# Patient Record
Sex: FEMALE | Race: WHITE | NOT HISPANIC OR LATINO | Employment: OTHER | URBAN - METROPOLITAN AREA
[De-identification: names, ages, dates, MRNs, and addresses within clinical notes are randomized per-mention and may not be internally consistent; named-entity substitution may affect disease eponyms.]

---

## 2023-02-09 PROBLEM — N39.0 RECURRENT UTI: Status: ACTIVE | Noted: 2023-02-09

## 2023-02-09 PROBLEM — E03.9 HYPOTHYROIDISM: Status: ACTIVE | Noted: 2023-02-09

## 2023-02-09 PROBLEM — E55.9 VITAMIN D DEFICIENCY: Status: ACTIVE | Noted: 2023-02-09

## 2023-02-09 PROBLEM — N64.4 BREAST PAIN, LEFT: Status: ACTIVE | Noted: 2023-02-09

## 2023-02-09 PROBLEM — I10 HYPERTENSION, ESSENTIAL, BENIGN: Status: ACTIVE | Noted: 2023-02-09

## 2023-02-09 PROBLEM — R14.0 BLOATING: Status: ACTIVE | Noted: 2023-02-09

## 2023-02-09 PROBLEM — R39.89 URINE TROUBLES: Status: ACTIVE | Noted: 2023-02-09

## 2023-02-09 PROBLEM — J06.9 URI (UPPER RESPIRATORY INFECTION): Status: ACTIVE | Noted: 2023-02-09

## 2023-02-09 PROBLEM — J01.90 ACUTE SINUSITIS, UNSPECIFIED: Status: ACTIVE | Noted: 2023-02-09

## 2023-02-09 PROBLEM — S82.899A ANKLE FRACTURE: Status: ACTIVE | Noted: 2023-02-09

## 2023-02-09 PROBLEM — M06.9 RHEUMATOID ARTHRITIS (MULTI): Status: ACTIVE | Noted: 2023-02-09

## 2023-02-09 PROBLEM — R30.0 DYSURIA: Status: ACTIVE | Noted: 2023-02-09

## 2023-02-09 PROBLEM — Z93.3 COLOSTOMY IN PLACE (MULTI): Status: ACTIVE | Noted: 2023-02-09

## 2023-02-09 PROBLEM — R82.90 FOUL SMELLING URINE: Status: ACTIVE | Noted: 2023-02-09

## 2023-02-09 PROBLEM — R94.31 ECG ABNORMAL: Status: ACTIVE | Noted: 2023-02-09

## 2023-02-09 PROBLEM — K57.32 DIVERTICULITIS, COLON: Status: ACTIVE | Noted: 2023-02-09

## 2023-02-09 PROBLEM — R19.7 DIARRHEA: Status: ACTIVE | Noted: 2023-02-09

## 2023-02-09 PROBLEM — E78.5 HYPERLIPIDEMIA: Status: ACTIVE | Noted: 2023-02-09

## 2023-02-09 PROBLEM — K21.9 CHRONIC GERD: Status: ACTIVE | Noted: 2023-02-09

## 2023-02-09 RX ORDER — METHOTREXATE 2.5 MG/1
6 TABLET ORAL
COMMUNITY

## 2023-02-09 RX ORDER — CHOLECALCIFEROL (VITAMIN D3) 125 MCG
CAPSULE ORAL
COMMUNITY
End: 2024-04-26 | Stop reason: WASHOUT

## 2023-02-09 RX ORDER — DILTIAZEM HYDROCHLORIDE 30 MG/1
1 TABLET, FILM COATED ORAL 2 TIMES DAILY
COMMUNITY
Start: 2018-08-22 | End: 2023-03-22 | Stop reason: SDUPTHER

## 2023-02-09 RX ORDER — LEVOTHYROXINE SODIUM 75 UG/1
1 TABLET ORAL DAILY
COMMUNITY
Start: 2018-08-15 | End: 2023-03-22 | Stop reason: SDUPTHER

## 2023-02-09 RX ORDER — ENALAPRIL MALEATE 5 MG/1
1 TABLET ORAL 2 TIMES DAILY
COMMUNITY
Start: 2018-08-15 | End: 2023-03-22 | Stop reason: SDUPTHER

## 2023-02-09 RX ORDER — FOLIC ACID 1 MG/1
1 TABLET ORAL DAILY
COMMUNITY
Start: 2018-08-22 | End: 2023-03-22 | Stop reason: SDUPTHER

## 2023-02-09 RX ORDER — LANSOPRAZOLE 30 MG/1
30 CAPSULE, DELAYED RELEASE ORAL DAILY
COMMUNITY
Start: 2018-08-15 | End: 2023-03-22 | Stop reason: SDUPTHER

## 2023-02-09 RX ORDER — MULTIVIT-MIN/IRON/FOLIC ACID/K 18-600-40
CAPSULE ORAL
COMMUNITY
End: 2024-04-26 | Stop reason: WASHOUT

## 2023-02-09 RX ORDER — ATORVASTATIN CALCIUM 80 MG/1
1 TABLET, FILM COATED ORAL NIGHTLY
COMMUNITY
Start: 2018-08-15 | End: 2023-03-22 | Stop reason: SDUPTHER

## 2023-03-22 ENCOUNTER — OFFICE VISIT (OUTPATIENT)
Dept: PRIMARY CARE | Facility: CLINIC | Age: 83
End: 2023-03-22
Payer: MEDICARE

## 2023-03-22 VITALS
OXYGEN SATURATION: 98 % | BODY MASS INDEX: 23.34 KG/M2 | DIASTOLIC BLOOD PRESSURE: 78 MMHG | HEART RATE: 103 BPM | SYSTOLIC BLOOD PRESSURE: 136 MMHG | RESPIRATION RATE: 16 BRPM | WEIGHT: 136 LBS

## 2023-03-22 DIAGNOSIS — K21.9 CHRONIC GERD: ICD-10-CM

## 2023-03-22 DIAGNOSIS — Z13.31 DEPRESSION SCREENING: ICD-10-CM

## 2023-03-22 DIAGNOSIS — E03.9 HYPOTHYROIDISM, UNSPECIFIED TYPE: ICD-10-CM

## 2023-03-22 DIAGNOSIS — Z00.00 MEDICARE ANNUAL WELLNESS VISIT, SUBSEQUENT: Primary | ICD-10-CM

## 2023-03-22 DIAGNOSIS — E53.8 FOLIC ACID DEFICIENCY: ICD-10-CM

## 2023-03-22 DIAGNOSIS — E78.5 HYPERLIPIDEMIA, UNSPECIFIED HYPERLIPIDEMIA TYPE: ICD-10-CM

## 2023-03-22 DIAGNOSIS — Z71.89 ADVANCE DIRECTIVE DISCUSSED WITH PATIENT: ICD-10-CM

## 2023-03-22 DIAGNOSIS — I10 HYPERTENSION, ESSENTIAL, BENIGN: ICD-10-CM

## 2023-03-22 PROCEDURE — 1159F MED LIST DOCD IN RCRD: CPT | Performed by: NURSE PRACTITIONER

## 2023-03-22 PROCEDURE — 3075F SYST BP GE 130 - 139MM HG: CPT | Performed by: NURSE PRACTITIONER

## 2023-03-22 PROCEDURE — 99214 OFFICE O/P EST MOD 30 MIN: CPT | Performed by: NURSE PRACTITIONER

## 2023-03-22 PROCEDURE — 1170F FXNL STATUS ASSESSED: CPT | Performed by: NURSE PRACTITIONER

## 2023-03-22 PROCEDURE — 1160F RVW MEDS BY RX/DR IN RCRD: CPT | Performed by: NURSE PRACTITIONER

## 2023-03-22 PROCEDURE — 3078F DIAST BP <80 MM HG: CPT | Performed by: NURSE PRACTITIONER

## 2023-03-22 RX ORDER — FOLIC ACID 1 MG/1
1 TABLET ORAL DAILY
Qty: 90 TABLET | Refills: 2 | Status: SHIPPED | OUTPATIENT
Start: 2023-03-22 | End: 2024-03-26 | Stop reason: SDUPTHER

## 2023-03-22 RX ORDER — ATORVASTATIN CALCIUM 80 MG/1
80 TABLET, FILM COATED ORAL NIGHTLY
Qty: 90 TABLET | Refills: 3 | Status: SHIPPED | OUTPATIENT
Start: 2023-03-22 | End: 2023-09-22 | Stop reason: SDUPTHER

## 2023-03-22 RX ORDER — LANSOPRAZOLE 30 MG/1
30 CAPSULE, DELAYED RELEASE ORAL DAILY
Qty: 90 CAPSULE | Refills: 3 | Status: SHIPPED | OUTPATIENT
Start: 2023-03-22 | End: 2024-03-26 | Stop reason: SDUPTHER

## 2023-03-22 RX ORDER — LEVOTHYROXINE SODIUM 75 UG/1
75 TABLET ORAL DAILY
Qty: 90 TABLET | Refills: 3 | Status: SHIPPED | OUTPATIENT
Start: 2023-03-22 | End: 2024-03-26 | Stop reason: SDUPTHER

## 2023-03-22 RX ORDER — CHOLECALCIFEROL (VITAMIN D3) 125 MCG
125 CAPSULE ORAL DAILY
Qty: 90 CAPSULE | Refills: 3 | Status: CANCELLED | OUTPATIENT
Start: 2023-03-22

## 2023-03-22 RX ORDER — ENALAPRIL MALEATE 5 MG/1
5 TABLET ORAL 2 TIMES DAILY
Qty: 90 TABLET | Refills: 3 | Status: SHIPPED | OUTPATIENT
Start: 2023-03-22 | End: 2023-04-17

## 2023-03-22 RX ORDER — DILTIAZEM HYDROCHLORIDE 30 MG/1
30 TABLET, FILM COATED ORAL 2 TIMES DAILY
Qty: 90 TABLET | Refills: 3 | Status: SHIPPED | OUTPATIENT
Start: 2023-03-22 | End: 2023-09-22 | Stop reason: SDUPTHER

## 2023-03-22 RX ORDER — MULTIVIT-MIN/IRON/FOLIC ACID/K 18-600-40
CAPSULE ORAL
Qty: 60 CAPSULE | Status: CANCELLED | OUTPATIENT
Start: 2023-03-22

## 2023-03-22 ASSESSMENT — ACTIVITIES OF DAILY LIVING (ADL)
BATHING: INDEPENDENT
MANAGING_FINANCES: INDEPENDENT
DOING_HOUSEWORK: INDEPENDENT
GROCERY_SHOPPING: INDEPENDENT
TAKING_MEDICATION: INDEPENDENT
DRESSING: INDEPENDENT

## 2023-03-22 ASSESSMENT — PATIENT HEALTH QUESTIONNAIRE - PHQ9
2. FEELING DOWN, DEPRESSED OR HOPELESS: NOT AT ALL
SUM OF ALL RESPONSES TO PHQ9 QUESTIONS 1 AND 2: 0
1. LITTLE INTEREST OR PLEASURE IN DOING THINGS: NOT AT ALL

## 2023-03-22 ASSESSMENT — ENCOUNTER SYMPTOMS
LOSS OF SENSATION IN FEET: 0
OCCASIONAL FEELINGS OF UNSTEADINESS: 0
DEPRESSION: 0

## 2023-03-22 NOTE — PROGRESS NOTES
Subjective   Reason for Visit: Nina Dalal is an 82 y.o. female here for a Medicare Wellness visit.     Past Medical, Surgical, and Family History reviewed and updated in chart.    Reviewed all medications by prescribing practitioner or clinical pharmacist (such as prescriptions, OTCs, herbal therapies and supplements) and documented in the medical record.    Patient is also here for management of multiple chronic diseases.  Reports that she will be having a left knee surgery in May of this year at the [facility] to be completed by Dr. Yeboah.  Denies acute medical complaint.        Patient Care Team:  ALLI Lima-CNP as PCP - General (Family Medicine)  Figueroa Antoine MD as PCP - [facility] Attributed Provider     Review of Systems   All other systems reviewed and are negative.      Objective   Vitals:  /78 (BP Location: Left arm)   Pulse 103   Resp 16   Wt 61.7 kg (136 lb)   SpO2 98%   BMI 23.34 kg/m²       Physical Exam  Vitals and nursing note reviewed.   Constitutional:       Appearance: Normal appearance.   HENT:      Head: Normocephalic and atraumatic.      Right Ear: Tympanic membrane, ear canal and external ear normal.      Left Ear: Tympanic membrane, ear canal and external ear normal.      Nose: Nose normal.      Mouth/Throat:      Mouth: Mucous membranes are moist.   Eyes:      Extraocular Movements: Extraocular movements intact.      Conjunctiva/sclera: Conjunctivae normal.      Pupils: Pupils are equal, round, and reactive to light.   Cardiovascular:      Rate and Rhythm: Normal rate and regular rhythm.      Pulses: Normal pulses.      Heart sounds: Normal heart sounds.   Pulmonary:      Effort: Pulmonary effort is normal.      Breath sounds: Normal breath sounds.   Abdominal:      General: Abdomen is flat. Bowel sounds are normal.      Palpations: Abdomen is soft.   Musculoskeletal:         General: Normal range of motion.      Cervical back: Normal range of motion and  neck supple.   Skin:     General: Skin is warm and dry.      Capillary Refill: Capillary refill takes more than 3 seconds.   Neurological:      General: No focal deficit present.      Mental Status: She is alert and oriented to person, place, and time.   Psychiatric:         Mood and Affect: Mood normal.         Behavior: Behavior normal.         Thought Content: Thought content normal.         Judgment: Judgment normal.         Assessment/Plan   Problem List Items Addressed This Visit          Circulatory    Hypertension, essential, benign    Relevant Medications    dilTIAZem (Cardizem) 30 mg immediate release tablet    enalapril (Vasotec) 5 mg tablet       Digestive    Chronic GERD    Relevant Medications    lansoprazole (Prevacid) 30 mg DR capsule       Endocrine/Metabolic    Hypothyroidism - Primary    Relevant Medications    levothyroxine (Synthroid, Levoxyl) 75 mcg tablet       Other    Hyperlipidemia    Relevant Medications    atorvastatin (Lipitor) 80 mg tablet     Other Visit Diagnoses       Folic acid deficiency        Relevant Medications    folic acid (Folvite) 1 mg tablet

## 2023-04-17 ENCOUNTER — TELEPHONE (OUTPATIENT)
Dept: PRIMARY CARE | Facility: CLINIC | Age: 83
End: 2023-04-17
Payer: MEDICARE

## 2023-04-17 DIAGNOSIS — I10 HYPERTENSION, ESSENTIAL, BENIGN: ICD-10-CM

## 2023-04-17 RX ORDER — ENALAPRIL MALEATE 10 MG/1
10 TABLET ORAL 2 TIMES DAILY
Qty: 180 TABLET | Refills: 1 | Status: SHIPPED | OUTPATIENT
Start: 2023-04-17 | End: 2023-09-22 | Stop reason: SDUPTHER

## 2023-04-17 NOTE — TELEPHONE ENCOUNTER
Patient was at [facility] today and is scheduled to have a total knee replacement on May 1st but her blood pressure in the office today was 183/90 and 177/80. Anesthesiologist would like it under control before surgery. Please advise

## 2023-04-24 ENCOUNTER — TELEPHONE (OUTPATIENT)
Dept: PRIMARY CARE | Facility: CLINIC | Age: 83
End: 2023-04-24
Payer: MEDICARE

## 2023-04-24 NOTE — TELEPHONE ENCOUNTER
[facility] would like the surgery clearance for the patient to be faxed to [phone]

## 2023-04-28 ENCOUNTER — OFFICE VISIT (OUTPATIENT)
Dept: PRIMARY CARE | Facility: CLINIC | Age: 83
End: 2023-04-28
Payer: MEDICARE

## 2023-04-28 VITALS
SYSTOLIC BLOOD PRESSURE: 120 MMHG | HEIGHT: 64 IN | HEART RATE: 108 BPM | BODY MASS INDEX: 23.01 KG/M2 | OXYGEN SATURATION: 97 % | WEIGHT: 134.8 LBS | DIASTOLIC BLOOD PRESSURE: 74 MMHG | RESPIRATION RATE: 18 BRPM

## 2023-04-28 DIAGNOSIS — G89.29 CHRONIC PAIN OF LEFT KNEE: ICD-10-CM

## 2023-04-28 DIAGNOSIS — Z01.818 PREOPERATIVE CLEARANCE: Primary | ICD-10-CM

## 2023-04-28 DIAGNOSIS — M25.562 CHRONIC PAIN OF LEFT KNEE: ICD-10-CM

## 2023-04-28 PROCEDURE — 1159F MED LIST DOCD IN RCRD: CPT

## 2023-04-28 PROCEDURE — 1160F RVW MEDS BY RX/DR IN RCRD: CPT

## 2023-04-28 PROCEDURE — 1036F TOBACCO NON-USER: CPT

## 2023-04-28 PROCEDURE — 99213 OFFICE O/P EST LOW 20 MIN: CPT

## 2023-04-28 PROCEDURE — 3074F SYST BP LT 130 MM HG: CPT

## 2023-04-28 PROCEDURE — 3078F DIAST BP <80 MM HG: CPT

## 2023-04-28 ASSESSMENT — ENCOUNTER SYMPTOMS
CONSTITUTIONAL NEGATIVE: 1
ENDOCRINE NEGATIVE: 1
PSYCHIATRIC NEGATIVE: 1
EYES NEGATIVE: 1
GASTROINTESTINAL NEGATIVE: 1
CARDIOVASCULAR NEGATIVE: 1
NEUROLOGICAL NEGATIVE: 1
MUSCULOSKELETAL NEGATIVE: 1
HEMATOLOGIC/LYMPHATIC NEGATIVE: 1
RESPIRATORY NEGATIVE: 1
ALLERGIC/IMMUNOLOGIC NEGATIVE: 1

## 2023-04-28 ASSESSMENT — PATIENT HEALTH QUESTIONNAIRE - PHQ9
SUM OF ALL RESPONSES TO PHQ9 QUESTIONS 1 AND 2: 0
2. FEELING DOWN, DEPRESSED OR HOPELESS: NOT AT ALL
1. LITTLE INTEREST OR PLEASURE IN DOING THINGS: NOT AT ALL

## 2023-04-28 NOTE — PROGRESS NOTES
Surgical clearance- left total knee replacemenmt 5/1/23    Paperwork must be in to [facility] today

## 2023-04-28 NOTE — PROGRESS NOTES
Referring Surgeon: Dr. Lyndon Yeboah  Date of Surgery: 5/1/2023  Planned Surgery/Procedure: Left Jama assisted Total knee Arthroplasty  Incidation for above: Chronic Left knee pain    Review of Systems   Constitutional: Negative.    HENT: Negative.     Eyes: Negative.    Respiratory: Negative.     Cardiovascular: Negative.    Gastrointestinal: Negative.    Endocrine: Negative.    Genitourinary: Negative.    Musculoskeletal: Negative.    Skin: Negative.    Allergic/Immunologic: Negative.    Neurological: Negative.    Hematological: Negative.    Psychiatric/Behavioral: Negative.     All other systems reviewed and are negative.      Past Medical History:   Diagnosis Date    Nontoxic multinodular goiter     Multinodular goiter    Personal history of other diseases of the digestive system     History of gastroesophageal reflux (GERD)    Personal history of other diseases of the digestive system 08/14/2020    History of diverticulitis    Personal history of other specified conditions     History of fibrocystic disease of breast     Past Surgical History:   Procedure Laterality Date    OTHER SURGICAL HISTORY  08/14/2020    Sigmoidectomy    OTHER SURGICAL HISTORY  11/26/2019    Hysterectomy total abdominal with removal of both ovaries    OTHER SURGICAL HISTORY  11/26/2019    Cataract surgery    OTHER SURGICAL HISTORY  10/26/2020    Resection     Family History   Problem Relation Name Age of Onset    Diabetes Mother      Hypertension Mother      Lung cancer Father       Social History     Socioeconomic History    Marital status: Single     Spouse name: None    Number of children: None    Years of education: None    Highest education level: None   Occupational History    None   Tobacco Use    Smoking status: Never    Smokeless tobacco: Never   Vaping Use    Vaping status: None   Substance and Sexual Activity    Alcohol use: None    Drug use: Never    Sexual activity: None   Other Topics Concern    None   Social History  Narrative    None     Social Determinants of Health     Financial Resource Strain: Not on file   Food Insecurity: Not on file   Transportation Needs: Not on file   Physical Activity: Not on file   Stress: Not on file   Social Connections: Not on file   Intimate Partner Violence: Not on file   Housing Stability: Not on file       Current Outpatient Medications:     ascorbic acid, vitamin C, 500 mg capsule, Take by mouth., Disp: , Rfl:     atorvastatin (Lipitor) 80 mg tablet, Take 1 tablet (80 mg) by mouth once daily at bedtime., Disp: 90 tablet, Rfl: 3    cholecalciferol (Vitamin D-3) 125 MCG (5000 UT) capsule, Take by mouth., Disp: , Rfl:     dilTIAZem (Cardizem) 30 mg immediate release tablet, Take 1 tablet (30 mg) by mouth in the morning and 1 tablet (30 mg) before bedtime., Disp: 90 tablet, Rfl: 3    enalapril (Vasotec) 10 mg tablet, Take 1 tablet (10 mg) by mouth in the morning and 1 tablet (10 mg) before bedtime., Disp: 180 tablet, Rfl: 1    folic acid (Folvite) 1 mg tablet, Take 1 tablet (1 mg) by mouth once daily., Disp: 90 tablet, Rfl: 2    lansoprazole (Prevacid) 30 mg DR capsule, Take 1 capsule (30 mg) by mouth once daily., Disp: 90 capsule, Rfl: 3    levothyroxine (Synthroid, Levoxyl) 75 mcg tablet, Take 1 tablet (75 mcg) by mouth once daily., Disp: 90 tablet, Rfl: 3    methotrexate (Trexall) 2.5 mg tablet, Take 6 tablets (15 mg total) by mouth 1 (one) time per week., Disp: , Rfl:     Physical Exam  Vitals and nursing note reviewed.   Constitutional:       Appearance: Normal appearance.   HENT:      Head: Normocephalic and atraumatic.      Right Ear: Tympanic membrane normal.      Left Ear: Tympanic membrane normal.      Nose: Nose normal.      Mouth/Throat:      Mouth: Mucous membranes are moist.      Pharynx: Oropharynx is clear.   Eyes:      Extraocular Movements: Extraocular movements intact.      Conjunctiva/sclera: Conjunctivae normal.      Pupils: Pupils are equal, round, and reactive to light.    Cardiovascular:      Rate and Rhythm: Normal rate and regular rhythm.   Pulmonary:      Effort: Pulmonary effort is normal.      Breath sounds: Normal breath sounds.   Abdominal:      General: Bowel sounds are normal.      Palpations: Abdomen is soft.   Musculoskeletal:         General: Normal range of motion.      Cervical back: Normal range of motion and neck supple.   Skin:     General: Skin is warm.      Capillary Refill: Capillary refill takes less than 2 seconds.   Neurological:      General: No focal deficit present.      Mental Status: She is alert and oriented to person, place, and time. Mental status is at baseline.   Psychiatric:         Mood and Affect: Mood normal.         Behavior: Behavior normal.         Thought Content: Thought content normal.         Judgment: Judgment normal.         Assesment/Plan:     Diagnostic tests reviewed or ordered:   Labs: N/A  Imaging: N/A  EKG: N/A    Medications to Adjust:   Antihypertensives--> Enalapril 10mg BID  Anticoagulation/Antiplatelet--> N/A  Antihyperglycemics--> N/A  NSAIDS--> N/A  Others--> N/A    I have discussed the above recommendations with the patient in detail, in yoseph and lay terms, and provded a verbal summery of recommendations. We have discussed that no surgery is without risk, but that the goal of the preoperative assesemenet is to optimize risk, and that was clearly understood by the patient. I have given ample oppoptunity for the patient to ask questions, and answered all questons to their stated satisfaction.    Prepared for surgery--> YES    No follow-ups on file. Follow up with orthopedics and PCP at scheduled appointments.     Discussed the above with the patient using shared decision making. the patient is in agreement with the iagnostic and treatment plan.      This document was generated using the assistance of voice recognition software. If there are any errors of spelling, grammar, syntax, or meaning; please feel free to contact me  directly for clarification.

## 2023-07-18 ENCOUNTER — PATIENT OUTREACH (OUTPATIENT)
Dept: PRIMARY CARE | Facility: CLINIC | Age: 83
End: 2023-07-18
Payer: MEDICARE

## 2023-07-18 NOTE — PROGRESS NOTES
Discharge Facility: [facility]  Discharge Diagnosis: Hematoma   Admission Date: 7-16-23  Discharge Date: 7-17-23     TCM complete.  Discharge date 7-17-23   2 attempts were made to reach patient to assess needs.   No return call as of this note.   If patient schedules follow up within 14 days of discharge, visit is TCM billable.  Message sent to practice clinical pool to reach out to patient and schedule an appointment within 7-13 days from discharge date.    If patient meets criteria for moderately complex & has follow-up within 14 days-can bill [id number].   If patient meets criteria for highly complex & has follow-up visit within 7 days-can bill [id number].    *virtual follow up needs modifier added (95 or GT)   *AWV AND TCM CAN BE BILLED TOGETHER WITH 25 MODIFIER    Alicia Aceves LPN

## 2023-07-21 ENCOUNTER — TELEPHONE (OUTPATIENT)
Dept: PRIMARY CARE | Facility: CLINIC | Age: 83
End: 2023-07-21
Payer: MEDICARE

## 2023-07-21 NOTE — TELEPHONE ENCOUNTER
----- Message from Jenn Manning MA sent at 7/19/2023 11:28 AM EDT -----  Regarding: FW: Hospital follow up    ----- Message -----  From: Alicia Aceves LPN  Sent: 7/19/2023  11:17 AM EDT  To:  [id number]  Subject: Hospital follow up                               Discharge Facility: [facility]  Discharge Diagnosis: Hematoma   Admission Date: 7-16-23  Discharge Date: 7-17-23     TCM complete.  Discharge date 7-17-23   2 attempts were made to reach patient to assess needs.   No return call as of this note.   If patient schedules follow up within 14 days of discharge, visit is TCM billable.  Message sent to practice clinical pool to reach out to patient and schedule an appointment within 7-13 days from discharge date.    If patient meets criteria for moderately complex & has follow-up within 14 days-can bill [id number].   If patient meets criteria for highly complex & has follow-up visit within 7 days-can bill [id number].    #virtual follow up needs modifier added (95 or GT)   #AWV AND TCM CAN BE BILLED TOGETHER WITH 25 MODIFIER    Alicia Aceves LPN

## 2023-07-21 NOTE — TELEPHONE ENCOUNTER
----- Message from Jenn Manning MA sent at 7/19/2023 11:28 AM EDT -----  Regarding: FW: Hospital follow up    ----- Message -----  From: Alicia Aceves LPN  Sent: 7/19/2023  11:17 AM EDT  To:  [id number]  Subject: Hospital follow up                               Discharge Facility: [facility]  Discharge Diagnosis: Hematoma   Admission Date: 7-16-23  Discharge Date: 7-17-23     TCM complete.  Discharge date 7-17-23   2 attempts were made to reach patient to assess needs.   No return call as of this note.   If patient schedules follow up within 14 days of discharge, visit is TCM billable.  Message sent to practice clinical pool to reach out to patient and schedule an appointment within 7-13 days from discharge date.    If patient meets criteria for moderately complex & has follow-up within 14 days-can bill [id number].   If patient meets criteria for highly complex & has follow-up visit within 7 days-can bill [id number].    #virtual follow up needs modifier added (95 or GT)   #AWV AND TCM CAN BE BILLED TOGETHER WITH 25 MODIFIER    Alicia Aceves LPN

## 2023-08-02 ENCOUNTER — PATIENT OUTREACH (OUTPATIENT)
Dept: PRIMARY CARE | Facility: CLINIC | Age: 83
End: 2023-08-02
Payer: MEDICARE

## 2023-08-02 NOTE — PROGRESS NOTES
Unable to reach patient for call back after patient's follow up appointment with PCP.   LVM with call back number for patient to call if needed   If no voicemail available call attempts x 2 were made to contact the patient to assist with any questions or concerns patient may have.    Alicia Aceves LPN

## 2023-08-11 ENCOUNTER — APPOINTMENT (OUTPATIENT)
Dept: PRIMARY CARE | Facility: CLINIC | Age: 83
End: 2023-08-11
Payer: MEDICARE

## 2023-09-22 ENCOUNTER — OFFICE VISIT (OUTPATIENT)
Dept: PRIMARY CARE | Facility: CLINIC | Age: 83
End: 2023-09-22
Payer: MEDICARE

## 2023-09-22 VITALS
SYSTOLIC BLOOD PRESSURE: 122 MMHG | HEIGHT: 64 IN | BODY MASS INDEX: 23.42 KG/M2 | HEART RATE: 101 BPM | DIASTOLIC BLOOD PRESSURE: 82 MMHG | RESPIRATION RATE: 16 BRPM | OXYGEN SATURATION: 98 % | WEIGHT: 137.2 LBS

## 2023-09-22 DIAGNOSIS — E03.9 HYPOTHYROIDISM, UNSPECIFIED TYPE: ICD-10-CM

## 2023-09-22 DIAGNOSIS — Z23 NEED FOR INFLUENZA VACCINATION: ICD-10-CM

## 2023-09-22 DIAGNOSIS — Z00.00 MEDICARE ANNUAL WELLNESS VISIT, SUBSEQUENT: ICD-10-CM

## 2023-09-22 DIAGNOSIS — D64.9 ANEMIA, UNSPECIFIED TYPE: ICD-10-CM

## 2023-09-22 DIAGNOSIS — I10 HYPERTENSION, ESSENTIAL, BENIGN: Primary | ICD-10-CM

## 2023-09-22 DIAGNOSIS — M06.9 RHEUMATOID ARTHRITIS INVOLVING MULTIPLE SITES, UNSPECIFIED WHETHER RHEUMATOID FACTOR PRESENT (MULTI): ICD-10-CM

## 2023-09-22 DIAGNOSIS — K21.9 CHRONIC GERD: ICD-10-CM

## 2023-09-22 DIAGNOSIS — E78.5 HYPERLIPIDEMIA, UNSPECIFIED HYPERLIPIDEMIA TYPE: ICD-10-CM

## 2023-09-22 DIAGNOSIS — E55.9 VITAMIN D DEFICIENCY: ICD-10-CM

## 2023-09-22 PROBLEM — T14.8XXA HEMATOMA: Status: ACTIVE | Noted: 2023-07-16

## 2023-09-22 PROBLEM — M48.061 SPINAL STENOSIS OF LUMBAR REGION: Status: ACTIVE | Noted: 2023-09-22

## 2023-09-22 PROBLEM — M17.9 OSTEOARTHRITIS OF KNEE: Status: ACTIVE | Noted: 2023-09-22

## 2023-09-22 PROBLEM — T14.90XA TRAUMA: Status: ACTIVE | Noted: 2023-07-17
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
## 2023-09-22 PROCEDURE — 3074F SYST BP LT 130 MM HG: CPT | Performed by: NURSE PRACTITIONER

## 2023-09-22 PROCEDURE — 3079F DIAST BP 80-89 MM HG: CPT | Performed by: NURSE PRACTITIONER

## 2023-09-22 PROCEDURE — 1160F RVW MEDS BY RX/DR IN RCRD: CPT | Performed by: NURSE PRACTITIONER

## 2023-09-22 PROCEDURE — G0008 ADMIN INFLUENZA VIRUS VAC: HCPCS | Performed by: NURSE PRACTITIONER

## 2023-09-22 PROCEDURE — 1159F MED LIST DOCD IN RCRD: CPT | Performed by: NURSE PRACTITIONER

## 2023-09-22 PROCEDURE — 1036F TOBACCO NON-USER: CPT | Performed by: NURSE PRACTITIONER

## 2023-09-22 PROCEDURE — 90662 IIV NO PRSV INCREASED AG IM: CPT | Performed by: NURSE PRACTITIONER

## 2023-09-22 PROCEDURE — 99214 OFFICE O/P EST MOD 30 MIN: CPT | Performed by: NURSE PRACTITIONER

## 2023-09-22 RX ORDER — BACITRACIN 500 [USP'U]/G
OINTMENT OPHTHALMIC
COMMUNITY
End: 2024-04-26 | Stop reason: WASHOUT

## 2023-09-22 RX ORDER — ATORVASTATIN CALCIUM 80 MG/1
80 TABLET, FILM COATED ORAL NIGHTLY
Qty: 90 TABLET | Refills: 3 | Status: SHIPPED | OUTPATIENT
Start: 2023-09-22 | End: 2024-03-26 | Stop reason: SDUPTHER

## 2023-09-22 RX ORDER — ACETAMINOPHEN 500 MG
1000 TABLET ORAL EVERY 8 HOURS PRN
COMMUNITY
Start: 2023-07-17

## 2023-09-22 RX ORDER — ENALAPRIL MALEATE 10 MG/1
10 TABLET ORAL 2 TIMES DAILY
Qty: 180 TABLET | Refills: 1 | Status: SHIPPED | OUTPATIENT
Start: 2023-09-22 | End: 2024-03-26 | Stop reason: SDUPTHER

## 2023-09-22 RX ORDER — DILTIAZEM HYDROCHLORIDE 30 MG/1
30 TABLET, FILM COATED ORAL 2 TIMES DAILY
Qty: 90 TABLET | Refills: 3 | Status: SHIPPED | OUTPATIENT
Start: 2023-09-22 | End: 2024-03-26 | Stop reason: SDUPTHER

## 2023-09-22 RX ORDER — CYCLOSPORINE 0.5 MG/ML
EMULSION OPHTHALMIC
COMMUNITY
End: 2024-04-26 | Stop reason: WASHOUT

## 2023-09-22 ASSESSMENT — ANXIETY QUESTIONNAIRES
2. NOT BEING ABLE TO STOP OR CONTROL WORRYING: NOT AT ALL
5. BEING SO RESTLESS THAT IT IS HARD TO SIT STILL: NOT AT ALL
3. WORRYING TOO MUCH ABOUT DIFFERENT THINGS: NOT AT ALL
4. TROUBLE RELAXING: NOT AT ALL
7. FEELING AFRAID AS IF SOMETHING AWFUL MIGHT HAPPEN: NOT AT ALL
6. BECOMING EASILY ANNOYED OR IRRITABLE: NOT AT ALL
1. FEELING NERVOUS, ANXIOUS, OR ON EDGE: NOT AT ALL
IF YOU CHECKED OFF ANY PROBLEMS ON THIS QUESTIONNAIRE, HOW DIFFICULT HAVE THESE PROBLEMS MADE IT FOR YOU TO DO YOUR WORK, TAKE CARE OF THINGS AT HOME, OR GET ALONG WITH OTHER PEOPLE: NOT DIFFICULT AT ALL
GAD7 TOTAL SCORE: 0

## 2023-09-22 ASSESSMENT — ENCOUNTER SYMPTOMS
DEPRESSION: 0
OCCASIONAL FEELINGS OF UNSTEADINESS: 0
LOSS OF SENSATION IN FEET: 0

## 2023-09-22 NOTE — PROGRESS NOTES
"Subjective   Patient ID: Nina Dalal is a 83 y.o. female who presents for Follow-up.    Patient is following up for management of multiple chronic diseases.  Advises that she takes all medication as prescribed and her symptoms are well controlled by current medication regimen with no side effect noted.  Advises that she had a knee replacement surgery followed by a fall a few weeks later resulting in a large hematoma on the right thigh area.  Advises that the hematoma is gradually healing and she is feeling much better.  Advises that she has no acute medical complaint.  Patient is due for seasonal influenza vaccine and it will be administered today during this office visit.         Review of Systems   All other systems reviewed and are negative.      Objective   /82   Pulse 101   Resp 16   Ht 1.626 m (5' 4\")   Wt 62.2 kg (137 lb 3.2 oz)   SpO2 98%   BMI 23.55 kg/m²     Physical Exam  Constitutional:       Appearance: Normal appearance.   HENT:      Head: Normocephalic and atraumatic.      Right Ear: External ear normal.      Left Ear: External ear normal.      Nose: Nose normal.      Mouth/Throat:      Mouth: Mucous membranes are moist.   Cardiovascular:      Rate and Rhythm: Normal rate and regular rhythm.      Pulses: Normal pulses.      Heart sounds: Normal heart sounds.   Pulmonary:      Effort: Pulmonary effort is normal.      Breath sounds: Normal breath sounds.   Abdominal:      General: Abdomen is flat. Bowel sounds are normal.      Palpations: Abdomen is soft.   Musculoskeletal:      Cervical back: Neck supple.   Skin:     General: Skin is warm and dry.   Neurological:      General: No focal deficit present.      Mental Status: She is alert and oriented to person, place, and time.   Psychiatric:         Mood and Affect: Mood normal.         Behavior: Behavior normal.         Thought Content: Thought content normal.         Judgment: Judgment normal.         Assessment/Plan   Problem List Items " Addressed This Visit       Hyperlipidemia    Hypertension, essential, benign

## 2023-09-22 NOTE — PATIENT INSTRUCTIONS
I strongly recommend that you get the current COVID booster.  Continue taking all current medications as prescribed. Complete labs as ordered and follow up in 6 months for annual medicare wellness exam.

## 2023-10-25 ENCOUNTER — TELEPHONE (OUTPATIENT)
Dept: PRIMARY CARE | Facility: CLINIC | Age: 83
End: 2023-10-25
Payer: MEDICARE

## 2023-11-22 ENCOUNTER — OFFICE VISIT (OUTPATIENT)
Dept: PRIMARY CARE | Facility: CLINIC | Age: 83
End: 2023-11-22
Payer: MEDICARE

## 2023-11-22 VITALS
HEIGHT: 64 IN | SYSTOLIC BLOOD PRESSURE: 128 MMHG | RESPIRATION RATE: 16 BRPM | OXYGEN SATURATION: 98 % | DIASTOLIC BLOOD PRESSURE: 78 MMHG | BODY MASS INDEX: 23.83 KG/M2 | WEIGHT: 139.6 LBS | HEART RATE: 90 BPM

## 2023-11-22 DIAGNOSIS — Z00.00 MEDICARE ANNUAL WELLNESS VISIT, SUBSEQUENT: ICD-10-CM

## 2023-11-22 DIAGNOSIS — Z01.818 PRE-OPERATIVE CLEARANCE: Primary | ICD-10-CM

## 2023-11-22 PROCEDURE — 99214 OFFICE O/P EST MOD 30 MIN: CPT | Performed by: NURSE PRACTITIONER

## 2023-11-22 PROCEDURE — 3078F DIAST BP <80 MM HG: CPT | Performed by: NURSE PRACTITIONER

## 2023-11-22 PROCEDURE — 1159F MED LIST DOCD IN RCRD: CPT | Performed by: NURSE PRACTITIONER

## 2023-11-22 PROCEDURE — 1036F TOBACCO NON-USER: CPT | Performed by: NURSE PRACTITIONER

## 2023-11-22 PROCEDURE — 1160F RVW MEDS BY RX/DR IN RCRD: CPT | Performed by: NURSE PRACTITIONER

## 2023-11-22 PROCEDURE — 3074F SYST BP LT 130 MM HG: CPT | Performed by: NURSE PRACTITIONER

## 2023-11-22 RX ORDER — CYANOCOBALAMIN (VITAMIN B-12) 250 MCG
250 TABLET ORAL DAILY
COMMUNITY
End: 2024-04-26 | Stop reason: WASHOUT

## 2023-11-22 ASSESSMENT — COLUMBIA-SUICIDE SEVERITY RATING SCALE - C-SSRS
1. IN THE PAST MONTH, HAVE YOU WISHED YOU WERE DEAD OR WISHED YOU COULD GO TO SLEEP AND NOT WAKE UP?: NO
6. HAVE YOU EVER DONE ANYTHING, STARTED TO DO ANYTHING, OR PREPARED TO DO ANYTHING TO END YOUR LIFE?: NO
2. HAVE YOU ACTUALLY HAD ANY THOUGHTS OF KILLING YOURSELF?: NO

## 2023-11-22 ASSESSMENT — ANXIETY QUESTIONNAIRES
5. BEING SO RESTLESS THAT IT IS HARD TO SIT STILL: NOT AT ALL
6. BECOMING EASILY ANNOYED OR IRRITABLE: NOT AT ALL
7. FEELING AFRAID AS IF SOMETHING AWFUL MIGHT HAPPEN: NOT AT ALL
3. WORRYING TOO MUCH ABOUT DIFFERENT THINGS: NOT AT ALL
GAD7 TOTAL SCORE: 0
4. TROUBLE RELAXING: NOT AT ALL
IF YOU CHECKED OFF ANY PROBLEMS ON THIS QUESTIONNAIRE, HOW DIFFICULT HAVE THESE PROBLEMS MADE IT FOR YOU TO DO YOUR WORK, TAKE CARE OF THINGS AT HOME, OR GET ALONG WITH OTHER PEOPLE: NOT DIFFICULT AT ALL
2. NOT BEING ABLE TO STOP OR CONTROL WORRYING: NOT AT ALL
1. FEELING NERVOUS, ANXIOUS, OR ON EDGE: NOT AT ALL

## 2023-11-22 ASSESSMENT — ENCOUNTER SYMPTOMS
LOSS OF SENSATION IN FEET: 0
OCCASIONAL FEELINGS OF UNSTEADINESS: 0
DEPRESSION: 0

## 2023-11-22 ASSESSMENT — PATIENT HEALTH QUESTIONNAIRE - PHQ9
2. FEELING DOWN, DEPRESSED OR HOPELESS: NOT AT ALL
1. LITTLE INTEREST OR PLEASURE IN DOING THINGS: NOT AT ALL
SUM OF ALL RESPONSES TO PHQ9 QUESTIONS 1 AND 2: 0

## 2023-11-22 NOTE — PATIENT INSTRUCTIONS
You are medically cleared for right total knee surgery as scheduled on 12/13/2023 with Dr. Yeboah. Keep your pre-admission testing appointment on 11/30/2023 at [place]. Keep your predetermined office follow up on 03/26/2024.

## 2023-11-26 PROBLEM — Z01.818 PRE-OPERATIVE CLEARANCE: Status: ACTIVE | Noted: 2023-11-26

## 2023-11-26 ASSESSMENT — ENCOUNTER SYMPTOMS: ARTHRALGIAS: 1

## 2023-11-27 NOTE — PROGRESS NOTES
"Subjective   Patient ID: Nina Dalal is a 83 y.o. female who presents for surgery clearance.    Patient is seeking medical clearance for right JUNAID Assisted Total knee arthroplasty scheduled for 12/13/2023 with Dr. Yeboah. Advises she is doing great and denies acute medical illness or symptom. She is scheduled for Pre-Admission Testing (PAT) on 11/30/2023.          Review of Systems   Musculoskeletal:  Positive for arthralgias.   All other systems reviewed and are negative.      Objective   /78   Pulse 90   Resp 16   Ht 1.626 m (5' 4\")   Wt 63.3 kg (139 lb 9.6 oz)   SpO2 98%   BMI 23.96 kg/m²     Physical Exam  Constitutional:       Appearance: Normal appearance.   HENT:      Head: Normocephalic and atraumatic.      Right Ear: External ear normal.      Left Ear: External ear normal.      Nose: Nose normal.      Mouth/Throat:      Mouth: Mucous membranes are moist.   Cardiovascular:      Rate and Rhythm: Normal rate and regular rhythm.      Pulses: Normal pulses.      Heart sounds: Normal heart sounds.   Pulmonary:      Effort: Pulmonary effort is normal.      Breath sounds: Normal breath sounds.   Abdominal:      General: Abdomen is flat. Bowel sounds are normal.      Palpations: Abdomen is soft.   Musculoskeletal:      Cervical back: Neck supple.   Skin:     General: Skin is warm and dry.   Neurological:      General: No focal deficit present.      Mental Status: She is alert and oriented to person, place, and time.   Psychiatric:         Mood and Affect: Mood normal.         Behavior: Behavior normal.         Thought Content: Thought content normal.         Judgment: Judgment normal.         Assessment/Plan   Problem List Items Addressed This Visit       Medicare annual wellness visit, subsequent    Relevant Orders    Follow Up In [facility] - PCP - Medicare Annual     Other Visit Diagnoses       Pre-operative clearance    -  Primary               "

## 2023-12-05 ENCOUNTER — APPOINTMENT (OUTPATIENT)
Dept: PRIMARY CARE | Facility: CLINIC | Age: 83
End: 2023-12-05
Payer: MEDICARE

## 2024-02-01 ENCOUNTER — TELEPHONE (OUTPATIENT)
Dept: PRIMARY CARE | Facility: CLINIC | Age: 84
End: 2024-02-01
Payer: MEDICARE

## 2024-02-01 DIAGNOSIS — R26.2 DIFFICULTY WALKING: ICD-10-CM

## 2024-02-01 DIAGNOSIS — M48.061 SPINAL STENOSIS OF LUMBAR REGION, UNSPECIFIED WHETHER NEUROGENIC CLAUDICATION PRESENT: Primary | ICD-10-CM

## 2024-03-22 ENCOUNTER — LAB (OUTPATIENT)
Dept: LAB | Facility: LAB | Age: 84
End: 2024-03-22
Payer: MEDICARE

## 2024-03-22 DIAGNOSIS — E78.5 HYPERLIPIDEMIA, UNSPECIFIED HYPERLIPIDEMIA TYPE: ICD-10-CM

## 2024-03-22 DIAGNOSIS — E03.9 HYPOTHYROIDISM, UNSPECIFIED TYPE: ICD-10-CM

## 2024-03-22 DIAGNOSIS — E55.9 VITAMIN D DEFICIENCY: ICD-10-CM

## 2024-03-22 DIAGNOSIS — D64.9 ANEMIA, UNSPECIFIED TYPE: ICD-10-CM

## 2024-03-22 DIAGNOSIS — I10 HYPERTENSION, ESSENTIAL, BENIGN: ICD-10-CM

## 2024-03-22 LAB
25(OH)D3 SERPL-MCNC: 32 NG/ML (ref 30–100)
ALBUMIN SERPL BCP-MCNC: 3.9 G/DL (ref 3.4–5)
ALP SERPL-CCNC: 103 U/L (ref 33–136)
ALT SERPL W P-5'-P-CCNC: 17 U/L (ref 7–45)
ANION GAP SERPL CALC-SCNC: 11 MMOL/L (ref 10–20)
AST SERPL W P-5'-P-CCNC: 26 U/L (ref 9–39)
BILIRUB SERPL-MCNC: 0.7 MG/DL (ref 0–1.2)
BUN SERPL-MCNC: 17 MG/DL (ref 6–23)
CALCIUM SERPL-MCNC: 9 MG/DL (ref 8.6–10.3)
CHLORIDE SERPL-SCNC: 104 MMOL/L (ref 98–107)
CHOLEST SERPL-MCNC: 169 MG/DL (ref 0–199)
CHOLESTEROL/HDL RATIO: 3.7
CO2 SERPL-SCNC: 28 MMOL/L (ref 21–32)
CREAT SERPL-MCNC: 0.77 MG/DL (ref 0.5–1.05)
EGFRCR SERPLBLD CKD-EPI 2021: 76 ML/MIN/1.73M*2
ERYTHROCYTE [DISTWIDTH] IN BLOOD BY AUTOMATED COUNT: 17.7 % (ref 11.5–14.5)
GLUCOSE SERPL-MCNC: 91 MG/DL (ref 74–99)
HCT VFR BLD AUTO: 42.8 % (ref 36–46)
HDLC SERPL-MCNC: 45.1 MG/DL
HGB BLD-MCNC: 13.1 G/DL (ref 12–16)
LDLC SERPL CALC-MCNC: 104 MG/DL
MCH RBC QN AUTO: 28.1 PG (ref 26–34)
MCHC RBC AUTO-ENTMCNC: 30.6 G/DL (ref 32–36)
MCV RBC AUTO: 92 FL (ref 80–100)
NON HDL CHOLESTEROL: 124 MG/DL (ref 0–149)
NRBC BLD-RTO: 0 /100 WBCS (ref 0–0)
PLATELET # BLD AUTO: 276 X10*3/UL (ref 150–450)
POTASSIUM SERPL-SCNC: 4.7 MMOL/L (ref 3.5–5.3)
PROT SERPL-MCNC: 6.8 G/DL (ref 6.4–8.2)
RBC # BLD AUTO: 4.66 X10*6/UL (ref 4–5.2)
SODIUM SERPL-SCNC: 138 MMOL/L (ref 136–145)
TRIGL SERPL-MCNC: 102 MG/DL (ref 0–149)
TSH SERPL-ACNC: 2.35 MIU/L (ref 0.44–3.98)
VIT B12 SERPL-MCNC: 450 PG/ML (ref 211–911)
VLDL: 20 MG/DL (ref 0–40)
WBC # BLD AUTO: 6.1 X10*3/UL (ref 4.4–11.3)

## 2024-03-22 PROCEDURE — 36415 COLL VENOUS BLD VENIPUNCTURE: CPT

## 2024-03-22 PROCEDURE — 85027 COMPLETE CBC AUTOMATED: CPT

## 2024-03-22 PROCEDURE — 80061 LIPID PANEL: CPT

## 2024-03-22 PROCEDURE — 84443 ASSAY THYROID STIM HORMONE: CPT

## 2024-03-22 PROCEDURE — 80053 COMPREHEN METABOLIC PANEL: CPT

## 2024-03-22 PROCEDURE — 82306 VITAMIN D 25 HYDROXY: CPT

## 2024-03-22 PROCEDURE — 82607 VITAMIN B-12: CPT

## 2024-03-26 ENCOUNTER — OFFICE VISIT (OUTPATIENT)
Dept: PRIMARY CARE | Facility: CLINIC | Age: 84
End: 2024-03-26
Payer: MEDICARE

## 2024-03-26 VITALS
WEIGHT: 134 LBS | HEART RATE: 88 BPM | SYSTOLIC BLOOD PRESSURE: 120 MMHG | OXYGEN SATURATION: 90 % | HEIGHT: 64 IN | RESPIRATION RATE: 16 BRPM | DIASTOLIC BLOOD PRESSURE: 70 MMHG | BODY MASS INDEX: 22.88 KG/M2

## 2024-03-26 DIAGNOSIS — R42 DIZZINESS: ICD-10-CM

## 2024-03-26 DIAGNOSIS — E03.9 HYPOTHYROIDISM, UNSPECIFIED TYPE: ICD-10-CM

## 2024-03-26 DIAGNOSIS — E78.5 HYPERLIPIDEMIA, UNSPECIFIED HYPERLIPIDEMIA TYPE: ICD-10-CM

## 2024-03-26 DIAGNOSIS — Z00.00 MEDICARE ANNUAL WELLNESS VISIT, SUBSEQUENT: Primary | ICD-10-CM

## 2024-03-26 DIAGNOSIS — Z93.3 PRESENCE OF COLOSTOMY (MULTI): ICD-10-CM

## 2024-03-26 DIAGNOSIS — R94.31 EKG ABNORMALITY: ICD-10-CM

## 2024-03-26 DIAGNOSIS — Z71.89 ADVANCE DIRECTIVE DISCUSSED WITH PATIENT: ICD-10-CM

## 2024-03-26 DIAGNOSIS — K21.9 CHRONIC GERD: ICD-10-CM

## 2024-03-26 DIAGNOSIS — I10 HYPERTENSION, ESSENTIAL, BENIGN: ICD-10-CM

## 2024-03-26 DIAGNOSIS — E53.8 FOLIC ACID DEFICIENCY: ICD-10-CM

## 2024-03-26 DIAGNOSIS — M06.9 RHEUMATOID ARTHRITIS INVOLVING MULTIPLE SITES, UNSPECIFIED WHETHER RHEUMATOID FACTOR PRESENT (MULTI): ICD-10-CM

## 2024-03-26 PROBLEM — M25.9 DISORDER OF SHOULDER: Status: ACTIVE | Noted: 2023-03-16

## 2024-03-26 PROBLEM — K57.32 DIVERTICULITIS OF COLON: Status: ACTIVE | Noted: 2023-02-09

## 2024-03-26 PROBLEM — M25.569 KNEE PAIN: Status: ACTIVE | Noted: 2023-02-20

## 2024-03-26 PROBLEM — T14.90XA TRAUMATIC INJURY: Status: ACTIVE | Noted: 2023-07-17

## 2024-03-26 PROBLEM — M25.069 HEMARTHROSIS OF KNEE: Status: ACTIVE | Noted: 2024-03-26

## 2024-03-26 PROBLEM — M25.469 SWELLING OF KNEE: Status: ACTIVE | Noted: 2024-03-26

## 2024-03-26 PROBLEM — N64.4 PAIN OF LEFT BREAST: Status: ACTIVE | Noted: 2023-02-07

## 2024-03-26 PROBLEM — N39.0 RECURRENT URINARY TRACT INFECTION: Status: ACTIVE | Noted: 2023-02-09

## 2024-03-26 PROBLEM — E04.2 MULTINODULAR GOITER: Status: ACTIVE | Noted: 2022-09-07

## 2024-03-26 PROBLEM — M79.89 SWELLING OF LOWER EXTREMITY: Status: ACTIVE | Noted: 2024-03-26

## 2024-03-26 PROBLEM — R29.898 WEAKNESS OF SHOULDER: Status: ACTIVE | Noted: 2024-03-26

## 2024-03-26 PROBLEM — M17.0 OSTEOARTHRITIS OF BOTH KNEES: Status: ACTIVE | Noted: 2023-02-20

## 2024-03-26 PROBLEM — M62.81 MUSCLE WEAKNESS OF EXTREMITY: Status: ACTIVE | Noted: 2024-03-26

## 2024-03-26 PROBLEM — J01.90 ACUTE SINUSITIS: Status: ACTIVE | Noted: 2023-02-09

## 2024-03-26 PROBLEM — M84.376A STRESS FRACTURE OF METATARSAL BONE: Status: ACTIVE | Noted: 2018-09-27

## 2024-03-26 PROBLEM — Z20.822 CONTACT WITH AND (SUSPECTED) EXPOSURE TO COVID-19: Status: ACTIVE | Noted: 2023-02-20

## 2024-03-26 PROBLEM — S82.899A FRACTURE OF ANKLE: Status: ACTIVE | Noted: 2023-02-09

## 2024-03-26 PROBLEM — R14.0 ABDOMINAL BLOATING: Status: ACTIVE | Noted: 2023-02-09

## 2024-03-26 PROCEDURE — 3074F SYST BP LT 130 MM HG: CPT | Performed by: NURSE PRACTITIONER

## 2024-03-26 PROCEDURE — 1170F FXNL STATUS ASSESSED: CPT | Performed by: NURSE PRACTITIONER

## 2024-03-26 PROCEDURE — 99214 OFFICE O/P EST MOD 30 MIN: CPT | Performed by: NURSE PRACTITIONER

## 2024-03-26 PROCEDURE — 1160F RVW MEDS BY RX/DR IN RCRD: CPT | Performed by: NURSE PRACTITIONER

## 2024-03-26 PROCEDURE — 99497 ADVNCD CARE PLAN 30 MIN: CPT | Performed by: NURSE PRACTITIONER

## 2024-03-26 PROCEDURE — 1036F TOBACCO NON-USER: CPT | Performed by: NURSE PRACTITIONER

## 2024-03-26 PROCEDURE — G0439 PPPS, SUBSEQ VISIT: HCPCS | Performed by: NURSE PRACTITIONER

## 2024-03-26 PROCEDURE — 1159F MED LIST DOCD IN RCRD: CPT | Performed by: NURSE PRACTITIONER

## 2024-03-26 PROCEDURE — 3078F DIAST BP <80 MM HG: CPT | Performed by: NURSE PRACTITIONER

## 2024-03-26 PROCEDURE — G0444 DEPRESSION SCREEN ANNUAL: HCPCS | Performed by: NURSE PRACTITIONER

## 2024-03-26 RX ORDER — ATORVASTATIN CALCIUM 80 MG/1
80 TABLET, FILM COATED ORAL NIGHTLY
Qty: 90 TABLET | Refills: 3 | Status: SHIPPED | OUTPATIENT
Start: 2024-03-26

## 2024-03-26 RX ORDER — LANSOPRAZOLE 30 MG/1
30 CAPSULE, DELAYED RELEASE ORAL DAILY
Qty: 90 CAPSULE | Refills: 3 | Status: SHIPPED | OUTPATIENT
Start: 2024-03-26

## 2024-03-26 RX ORDER — LEVOTHYROXINE SODIUM 75 UG/1
75 TABLET ORAL DAILY
Qty: 90 TABLET | Refills: 3 | Status: SHIPPED | OUTPATIENT
Start: 2024-03-26

## 2024-03-26 RX ORDER — FOLIC ACID 1 MG/1
1 TABLET ORAL DAILY
Qty: 90 TABLET | Refills: 2 | Status: SHIPPED | OUTPATIENT
Start: 2024-03-26

## 2024-03-26 RX ORDER — ENALAPRIL MALEATE 10 MG/1
10 TABLET ORAL 2 TIMES DAILY
Qty: 180 TABLET | Refills: 1 | Status: SHIPPED | OUTPATIENT
Start: 2024-03-26 | End: 2024-09-22

## 2024-03-26 RX ORDER — DILTIAZEM HYDROCHLORIDE 30 MG/1
30 TABLET, FILM COATED ORAL 2 TIMES DAILY
Qty: 90 TABLET | Refills: 3 | Status: SHIPPED | OUTPATIENT
Start: 2024-03-26 | End: 2024-04-26 | Stop reason: ALTCHOICE

## 2024-03-26 ASSESSMENT — ANXIETY QUESTIONNAIRES
4. TROUBLE RELAXING: NOT AT ALL
GAD7 TOTAL SCORE: 0
1. FEELING NERVOUS, ANXIOUS, OR ON EDGE: NOT AT ALL
5. BEING SO RESTLESS THAT IT IS HARD TO SIT STILL: NOT AT ALL
2. NOT BEING ABLE TO STOP OR CONTROL WORRYING: NOT AT ALL
6. BECOMING EASILY ANNOYED OR IRRITABLE: NOT AT ALL
3. WORRYING TOO MUCH ABOUT DIFFERENT THINGS: NOT AT ALL
7. FEELING AFRAID AS IF SOMETHING AWFUL MIGHT HAPPEN: NOT AT ALL
IF YOU CHECKED OFF ANY PROBLEMS ON THIS QUESTIONNAIRE, HOW DIFFICULT HAVE THESE PROBLEMS MADE IT FOR YOU TO DO YOUR WORK, TAKE CARE OF THINGS AT HOME, OR GET ALONG WITH OTHER PEOPLE: NOT DIFFICULT AT ALL

## 2024-03-26 ASSESSMENT — ENCOUNTER SYMPTOMS
OCCASIONAL FEELINGS OF UNSTEADINESS: 0
LOSS OF SENSATION IN FEET: 0
DEPRESSION: 0
LIGHT-HEADEDNESS: 1

## 2024-03-26 ASSESSMENT — ACTIVITIES OF DAILY LIVING (ADL)
BATHING: INDEPENDENT
DOING_HOUSEWORK: INDEPENDENT
TAKING_MEDICATION: INDEPENDENT
DRESSING: INDEPENDENT
MANAGING_FINANCES: INDEPENDENT
GROCERY_SHOPPING: INDEPENDENT

## 2024-03-26 NOTE — PATIENT INSTRUCTIONS
I have referred you to cardiology for history of abnormal EKG and lightheaded. I have also ordered bilateral carotid ultrasound and labs to be completed as advised. Follow up in 6 months.

## 2024-03-26 NOTE — PROGRESS NOTES
"Subjective   Reason for Visit: Nina Dalal is an 83 y.o. female here for a Medicare Wellness visit.     Past Medical, Surgical, and Family History reviewed and updated in chart.    Reviewed all medications by prescribing practitioner or clinical pharmacist (such as prescriptions, OTCs, herbal therapies and supplements) and documented in the medical record.    Patient is also following up for lab results review and management of multiple chronic diseases.  Her lab results are unremarkable besides slightly elevated LDL cholesterol at 104.  Advises she takes her medications as prescribed with no side effect noted.  She is requesting to be evaluated for complains of lightheadedness and has a history of abnormal EKG.        Patient Care Team:  CJ Lima as PCP - General (Family Medicine)  CJ Lima as PCP - [facility] ACO Attributed Provider     Review of Systems   Neurological:  Positive for light-headedness.   All other systems reviewed and are negative.      Objective   Vitals:  /70   Pulse 88   Resp 16   Ht 1.626 m (5' 4\")   Wt 60.8 kg (134 lb)   SpO2 90%   BMI 23.00 kg/m²       Physical Exam  Vitals reviewed.   Constitutional:       Appearance: Normal appearance.   HENT:      Head: Normocephalic and atraumatic.      Right Ear: Tympanic membrane, ear canal and external ear normal.      Left Ear: Tympanic membrane, ear canal and external ear normal.      Nose: Nose normal.      Mouth/Throat:      Mouth: Mucous membranes are moist.   Eyes:      Extraocular Movements: Extraocular movements intact.      Conjunctiva/sclera: Conjunctivae normal.      Pupils: Pupils are equal, round, and reactive to light.   Cardiovascular:      Rate and Rhythm: Normal rate and regular rhythm.      Pulses: Normal pulses.      Heart sounds: Normal heart sounds.   Pulmonary:      Effort: Pulmonary effort is normal.      Breath sounds: Normal breath sounds.   Abdominal:      General: Abdomen is flat. " Bowel sounds are normal.      Palpations: Abdomen is soft.   Musculoskeletal:      Cervical back: Neck supple.   Skin:     General: Skin is warm and dry.   Neurological:      General: No focal deficit present.      Mental Status: She is alert and oriented to person, place, and time.   Psychiatric:         Mood and Affect: Mood normal.         Behavior: Behavior normal.         Thought Content: Thought content normal.         Judgment: Judgment normal.         Assessment/Plan   Problem List Items Addressed This Visit       Chronic GERD    Hyperlipidemia    Hypertension, essential, benign    Hypothyroidism    Medicare annual wellness visit, subsequent     Other Visit Diagnoses       Routine general medical examination at health care facility    -  Primary    Folic acid deficiency        Continue folic acid 1 mg daily.

## 2024-03-28 ENCOUNTER — HOSPITAL ENCOUNTER (OUTPATIENT)
Dept: VASCULAR MEDICINE | Facility: HOSPITAL | Age: 84
Discharge: HOME | End: 2024-03-28
Payer: MEDICARE

## 2024-03-28 DIAGNOSIS — R09.89 OTHER SPECIFIED SYMPTOMS AND SIGNS INVOLVING THE CIRCULATORY AND RESPIRATORY SYSTEMS: ICD-10-CM

## 2024-03-28 DIAGNOSIS — R42 DIZZINESS: ICD-10-CM

## 2024-03-28 PROCEDURE — 93880 EXTRACRANIAL BILAT STUDY: CPT | Performed by: SURGERY

## 2024-03-28 PROCEDURE — 93880 EXTRACRANIAL BILAT STUDY: CPT

## 2024-04-03 ENCOUNTER — TELEPHONE (OUTPATIENT)
Dept: PRIMARY CARE | Facility: CLINIC | Age: 84
End: 2024-04-03
Payer: MEDICARE

## 2024-04-03 NOTE — TELEPHONE ENCOUNTER
----- Message from CJ Lima sent at 4/2/2024  8:12 PM EDT -----  Please tell patient that the ultrasound of her carotid arteries is normal.

## 2024-04-23 ENCOUNTER — APPOINTMENT (OUTPATIENT)
Dept: CARDIOLOGY | Facility: HOSPITAL | Age: 84
End: 2024-04-23
Payer: MEDICARE

## 2024-04-23 ENCOUNTER — HOSPITAL ENCOUNTER (EMERGENCY)
Facility: HOSPITAL | Age: 84
Discharge: HOME | End: 2024-04-23
Attending: EMERGENCY MEDICINE
Payer: MEDICARE

## 2024-04-23 ENCOUNTER — APPOINTMENT (OUTPATIENT)
Dept: RADIOLOGY | Facility: HOSPITAL | Age: 84
End: 2024-04-23
Payer: MEDICARE

## 2024-04-23 VITALS
BODY MASS INDEX: 22.39 KG/M2 | DIASTOLIC BLOOD PRESSURE: 77 MMHG | TEMPERATURE: 98.6 F | WEIGHT: 131.17 LBS | HEIGHT: 64 IN | SYSTOLIC BLOOD PRESSURE: 150 MMHG | OXYGEN SATURATION: 99 % | RESPIRATION RATE: 20 BRPM | HEART RATE: 78 BPM

## 2024-04-23 DIAGNOSIS — R42 LIGHTHEADEDNESS: Primary | ICD-10-CM

## 2024-04-23 LAB
ALBUMIN SERPL BCP-MCNC: 4 G/DL (ref 3.4–5)
ALP SERPL-CCNC: 99 U/L (ref 33–136)
ALT SERPL W P-5'-P-CCNC: 20 U/L (ref 7–45)
ANION GAP SERPL CALC-SCNC: 12 MMOL/L (ref 10–20)
APPEARANCE UR: CLEAR
AST SERPL W P-5'-P-CCNC: 27 U/L (ref 9–39)
BASOPHILS # BLD AUTO: 0.03 X10*3/UL (ref 0–0.1)
BASOPHILS NFR BLD AUTO: 0.5 %
BILIRUB SERPL-MCNC: 0.7 MG/DL (ref 0–1.2)
BILIRUB UR STRIP.AUTO-MCNC: NEGATIVE MG/DL
BUN SERPL-MCNC: 17 MG/DL (ref 6–23)
CALCIUM SERPL-MCNC: 9.5 MG/DL (ref 8.6–10.3)
CARDIAC TROPONIN I PNL SERPL HS: 12 NG/L (ref 0–13)
CHLORIDE SERPL-SCNC: 106 MMOL/L (ref 98–107)
CO2 SERPL-SCNC: 23 MMOL/L (ref 21–32)
COLOR UR: NORMAL
CREAT SERPL-MCNC: 0.75 MG/DL (ref 0.5–1.05)
EGFRCR SERPLBLD CKD-EPI 2021: 78 ML/MIN/1.73M*2
EOSINOPHIL # BLD AUTO: 0.22 X10*3/UL (ref 0–0.4)
EOSINOPHIL NFR BLD AUTO: 3.7 %
ERYTHROCYTE [DISTWIDTH] IN BLOOD BY AUTOMATED COUNT: 17.5 % (ref 11.5–14.5)
GLUCOSE SERPL-MCNC: 95 MG/DL (ref 74–99)
GLUCOSE UR STRIP.AUTO-MCNC: NEGATIVE MG/DL
HCT VFR BLD AUTO: 41.3 % (ref 36–46)
HGB BLD-MCNC: 13.6 G/DL (ref 12–16)
HOLD SPECIMEN: 293
HOLD SPECIMEN: NORMAL
IMM GRANULOCYTES # BLD AUTO: 0.02 X10*3/UL (ref 0–0.5)
IMM GRANULOCYTES NFR BLD AUTO: 0.3 % (ref 0–0.9)
KETONES UR STRIP.AUTO-MCNC: NEGATIVE MG/DL
LEUKOCYTE ESTERASE UR QL STRIP.AUTO: NEGATIVE
LYMPHOCYTES # BLD AUTO: 1.23 X10*3/UL (ref 0.8–3)
LYMPHOCYTES NFR BLD AUTO: 20.6 %
MAGNESIUM SERPL-MCNC: 2 MG/DL (ref 1.6–2.4)
MCH RBC QN AUTO: 29 PG (ref 26–34)
MCHC RBC AUTO-ENTMCNC: 32.9 G/DL (ref 32–36)
MCV RBC AUTO: 88 FL (ref 80–100)
MONOCYTES # BLD AUTO: 0.5 X10*3/UL (ref 0.05–0.8)
MONOCYTES NFR BLD AUTO: 8.4 %
NEUTROPHILS # BLD AUTO: 3.98 X10*3/UL (ref 1.6–5.5)
NEUTROPHILS NFR BLD AUTO: 66.5 %
NITRITE UR QL STRIP.AUTO: NEGATIVE
NRBC BLD-RTO: 0 /100 WBCS (ref 0–0)
PH UR STRIP.AUTO: 8 [PH]
PHOSPHATE SERPL-MCNC: 2.3 MG/DL (ref 2.5–4.9)
PLATELET # BLD AUTO: 267 X10*3/UL (ref 150–450)
POTASSIUM SERPL-SCNC: 4.1 MMOL/L (ref 3.5–5.3)
PROT SERPL-MCNC: 7.3 G/DL (ref 6.4–8.2)
PROT UR STRIP.AUTO-MCNC: NEGATIVE MG/DL
RBC # BLD AUTO: 4.69 X10*6/UL (ref 4–5.2)
RBC # UR STRIP.AUTO: NEGATIVE /UL
SODIUM SERPL-SCNC: 137 MMOL/L (ref 136–145)
SP GR UR STRIP.AUTO: 1
UROBILINOGEN UR STRIP.AUTO-MCNC: <2 MG/DL
WBC # BLD AUTO: 6 X10*3/UL (ref 4.4–11.3)

## 2024-04-23 PROCEDURE — 85025 COMPLETE CBC W/AUTO DIFF WBC: CPT | Performed by: EMERGENCY MEDICINE

## 2024-04-23 PROCEDURE — 36415 COLL VENOUS BLD VENIPUNCTURE: CPT | Performed by: EMERGENCY MEDICINE

## 2024-04-23 PROCEDURE — 2500000004 HC RX 250 GENERAL PHARMACY W/ HCPCS (ALT 636 FOR OP/ED): Performed by: EMERGENCY MEDICINE

## 2024-04-23 PROCEDURE — 71045 X-RAY EXAM CHEST 1 VIEW: CPT | Performed by: RADIOLOGY

## 2024-04-23 PROCEDURE — 84100 ASSAY OF PHOSPHORUS: CPT | Performed by: EMERGENCY MEDICINE

## 2024-04-23 PROCEDURE — 81003 URINALYSIS AUTO W/O SCOPE: CPT | Performed by: EMERGENCY MEDICINE

## 2024-04-23 PROCEDURE — 99283 EMERGENCY DEPT VISIT LOW MDM: CPT | Mod: 25

## 2024-04-23 PROCEDURE — 71045 X-RAY EXAM CHEST 1 VIEW: CPT

## 2024-04-23 PROCEDURE — 2500000001 HC RX 250 WO HCPCS SELF ADMINISTERED DRUGS (ALT 637 FOR MEDICARE OP): Performed by: EMERGENCY MEDICINE

## 2024-04-23 PROCEDURE — 93005 ELECTROCARDIOGRAM TRACING: CPT

## 2024-04-23 PROCEDURE — 80053 COMPREHEN METABOLIC PANEL: CPT | Performed by: EMERGENCY MEDICINE

## 2024-04-23 PROCEDURE — 83735 ASSAY OF MAGNESIUM: CPT | Performed by: EMERGENCY MEDICINE

## 2024-04-23 PROCEDURE — 84484 ASSAY OF TROPONIN QUANT: CPT | Performed by: EMERGENCY MEDICINE

## 2024-04-23 RX ORDER — DILTIAZEM HYDROCHLORIDE 30 MG/1
30 TABLET, FILM COATED ORAL ONCE
Status: COMPLETED | OUTPATIENT
Start: 2024-04-23 | End: 2024-04-23

## 2024-04-23 RX ADMIN — SODIUM CHLORIDE 1000 ML: 9 INJECTION, SOLUTION INTRAVENOUS at 10:45

## 2024-04-23 RX ADMIN — DILTIAZEM HYDROCHLORIDE 30 MG: 30 TABLET ORAL at 08:45

## 2024-04-23 ASSESSMENT — PAIN SCALES - GENERAL
PAINLEVEL_OUTOF10: 0 - NO PAIN
PAINLEVEL_OUTOF10: 0 - NO PAIN

## 2024-04-23 ASSESSMENT — LIFESTYLE VARIABLES
EVER FELT BAD OR GUILTY ABOUT YOUR DRINKING: NO
HAVE YOU EVER FELT YOU SHOULD CUT DOWN ON YOUR DRINKING: NO
HAVE PEOPLE ANNOYED YOU BY CRITICIZING YOUR DRINKING: NO
EVER HAD A DRINK FIRST THING IN THE MORNING TO STEADY YOUR NERVES TO GET RID OF A HANGOVER: NO
TOTAL SCORE: 0

## 2024-04-23 ASSESSMENT — COLUMBIA-SUICIDE SEVERITY RATING SCALE - C-SSRS
1. IN THE PAST MONTH, HAVE YOU WISHED YOU WERE DEAD OR WISHED YOU COULD GO TO SLEEP AND NOT WAKE UP?: NO
2. HAVE YOU ACTUALLY HAD ANY THOUGHTS OF KILLING YOURSELF?: NO
6. HAVE YOU EVER DONE ANYTHING, STARTED TO DO ANYTHING, OR PREPARED TO DO ANYTHING TO END YOUR LIFE?: NO

## 2024-04-23 ASSESSMENT — PAIN - FUNCTIONAL ASSESSMENT: PAIN_FUNCTIONAL_ASSESSMENT: 0-10

## 2024-04-23 NOTE — ED TRIAGE NOTES
Pt to ER via ambulance with c/o dizziness and weakness off and on x 3 days. Pt also c/o numbness to left side of face that comes and goes.

## 2024-04-23 NOTE — ED PROVIDER NOTES
HPI   Chief Complaint   Patient presents with    Dizziness     C/O feeling light headed and weak off and on x 3 days. Pt also c/o left side facial numbness that comes and goes.       Patient presents emergency department for dizziness.  Patient's had symptoms since last night.  Dizziness comes on with exertion.  During these episodes she does endorse blurry vision.  Second episode was today.  First episode lasted 10 minutes and improved with rest.  Second episode today lasted longer which prompted the patient to come here to the emergency department.  Patient denies any headache, chest pain, shortness of breath, nausea, vomiting, sick contacts.  Patient did not take her morning antihypertensive medications.                          Heavenly Coma Scale Score: 15         NIH Stroke Scale: 0             Patient History   Past Medical History:   Diagnosis Date    Nontoxic multinodular goiter     Multinodular goiter    Personal history of other diseases of the digestive system     History of gastroesophageal reflux (GERD)    Personal history of other diseases of the digestive system 08/14/2020    History of diverticulitis    Personal history of other specified conditions     History of fibrocystic disease of breast     Past Surgical History:   Procedure Laterality Date    OTHER SURGICAL HISTORY  08/14/2020    Sigmoidectomy    OTHER SURGICAL HISTORY  11/26/2019    Hysterectomy total abdominal with removal of both ovaries    OTHER SURGICAL HISTORY  11/26/2019    Cataract surgery    OTHER SURGICAL HISTORY  10/26/2020    Resection     Family History   Problem Relation Name Age of Onset    Diabetes Mother      Hypertension Mother      Lung cancer Father       Social History     Tobacco Use    Smoking status: Never    Smokeless tobacco: Never   Substance Use Topics    Alcohol use: Not on file    Drug use: Never       Physical Exam   ED Triage Vitals [04/23/24 0754]   Temperature Heart Rate Respirations BP   36.7 °C (98.1 °F) 91  18 (!) 183/103      Pulse Ox Temp Source Heart Rate Source Patient Position   99 % Tympanic Monitor Sitting      BP Location FiO2 (%)     Right arm --       Physical Exam  Vitals and nursing note reviewed.   Constitutional:       General: She is not in acute distress.     Appearance: She is well-developed.   HENT:      Head: Normocephalic and atraumatic.      Right Ear: External ear normal.      Left Ear: External ear normal.      Nose: Nose normal.      Mouth/Throat:      Mouth: Mucous membranes are moist.      Pharynx: Oropharynx is clear.   Eyes:      Extraocular Movements: Extraocular movements intact.      Conjunctiva/sclera: Conjunctivae normal.   Neck:      Comments: Trachea midline  Cardiovascular:      Rate and Rhythm: Normal rate.      Pulses: Normal pulses.   Pulmonary:      Effort: Pulmonary effort is normal. No respiratory distress.      Breath sounds: Normal breath sounds.   Abdominal:      General: Bowel sounds are normal.      Palpations: Abdomen is soft.      Tenderness: There is no abdominal tenderness.   Musculoskeletal:         General: No swelling or tenderness.      Cervical back: No tenderness.   Skin:     General: Skin is warm and dry.      Capillary Refill: Capillary refill takes less than 2 seconds.   Neurological:      General: No focal deficit present.      Mental Status: She is alert and oriented to person, place, and time.   Psychiatric:         Mood and Affect: Mood normal.         Thought Content: Thought content does not include homicidal or suicidal ideation.         ED Course & MDM   ED Course as of 04/23/24 1140   Tue Apr 23, 2024   0758 EKG obtained at 755 and interpreted by myself shows normal sinus rhythm, rate 93, , QTc is 483, no ST segment changes, T wave inversion in lead V2, incomplete right bundle branch block [JH]   0823 Patient presents with intermittent lightheadedness. Available chart reviewed. On initial assessment the patient was found non-toxic, no acute  distress, vitals hemodynamically stable and afebrile. Initial concern for MI, ACS, pneumonia, pneumothorax, dehydration, electrolyte abnormalities, anemia.  Patient's home diltiazem was ordered. [JH]   0848 CBC and Auto Differential(!)  CBC shows no leukocytosis, no anemia, no thrombocytopenia [JH]   0849 XR chest 1 view  Chest x-ray is read as negative, images personally reviewed without obvious acute findings. [JH]   0850 MAGNESIUM: 2.00 [JH]   0851 PHOSPHORUS(!): 2.3 [JH]   0851 Comprehensive metabolic panel  Metabolic panel shows no electrolyte abnormalities, normal kidney and liver function. [JH]   0918 Troponin I, High Sensitivity: 12 [JH]   0918 Normal orthostatic vital signs [JH]   1130 Urinalysis with Reflex Culture and Microscopic  Urinalysis is not concerning for infection. [JH]   1134 On today patient patient is asked to be discharged home.  No acute pathology identified.  Recommend having the patient track her blood pressures.  States she has a follow-up appoint with Dr. Calvo in 3 days, recommend she keep that appointment.    No indication for admission.  Discussed findings and diagnosis with the patient, follow-up and return to ED precautions given, patient voiced understanding, agrees with plan, questions answered, patient was discharged in stable condition. [JH]      ED Course User Index  [] Ty Peña MD         Diagnoses as of 04/23/24 1140   [facility]       Medical Decision Making      Procedure  Procedures     Ty Peña MD  04/23/24 1140

## 2024-04-24 LAB
ATRIAL RATE: 94 BPM
P AXIS: 55 DEGREES
PR INTERVAL: 181 MS
Q ONSET: 253 MS
QRS COUNT: 15 BEATS
QRS DURATION: 117 MS
QT INTERVAL: 388 MS
QTC CALCULATION(BAZETT): 483 MS
QTC FREDERICIA: 449 MS
R AXIS: -20 DEGREES
T AXIS: 45 DEGREES
T OFFSET: 447 MS
VENTRICULAR RATE: 93 BPM

## 2024-04-25 NOTE — PROGRESS NOTES
"Referred by Dr. Gary Geiger for Dizziness and HTN    Counseling:  The patient was counseled regarding diagnostic results, instructions for management, risk factor reductions, prognosis, patient and family education, impressions, risks and benefits of treatment options and importance of compliance with treatment.       History Of Present Illness:    Nina Dalal is a 84 y.o. female patient whose PMH is significant for hyperlipidemia, HTN, hypothyroidism, GERD, anemia, and rheumatoid arthritis. She presents today to establish cardiovascular care for the evaluation and management of dizziness and HTN. The patient was evaluated in the ED on 04/23/2024 for a chief complaint of dizziness. While in the ED, EKG showed NSR at a rate of 93 bpm, no ST segment changes, T wave inversion in lead V2 and  incomplete RBBB, labs were unremarkable, and CXR was negative. She was discharged home the same day for outpatient followup. Carotid U/S performed 03/28/2024 revealed less than 50% stenosis bilaterally. The patient states that following two prior orthopedic surgeries she was found to have abnormal EKGs. She was seen by her PCP on 03/26/2024 and was told that she had \"a blockage in the left side of my heart.\" Prior to  her ED presentation on 04/23/2024, her blood pressure was elevated in the 180s systolic range with associated dizziness/lightheadedness. She reports frequent episodes of dizziness/lightheadedness and her BP is persistently elevated. She also reports easy fatigability. The patient denies any CP, chest discomfort or SOB.     Past Surgical History:  She has a past surgical history that includes Other surgical history (08/14/2020); Other surgical history (11/26/2019); Other surgical history (11/26/2019); and Other surgical history (10/26/2020).      Social History:  She reports that she has never smoked. She has never used smokeless tobacco. She reports that she does not currently use alcohol. She reports that she does " not use drugs.    Family History:  Family History   Problem Relation Name Age of Onset    Diabetes Mother      Hypertension Mother      Lung cancer Father          Allergies:  Amoxicillin    Outpatient Medications:  Current Outpatient Medications   Medication Instructions    acetaminophen (TYLENOL) 1,000 mg, oral, Every 8 hours PRN    ascorbic acid, vitamin C, 500 mg capsule oral    atorvastatin (LIPITOR) 80 mg, oral, Nightly    bacitracin ophthalmic ointment     cholecalciferol (Vitamin D-3) 125 MCG (5000 UT) capsule oral    cyanocobalamin (VITAMIN B-12) 250 mcg, oral, Daily    cycloSPORINE (Restasis) 0.05 % ophthalmic emulsion     dilTIAZem (CARDIZEM) 30 mg, oral, 2 times daily    enalapril (VASOTEC) 10 mg, oral, 2 times daily    folic acid (FOLVITE) 1 mg, oral, Daily    lansoprazole (PREVACID) 30 mg, oral, Daily    levothyroxine (SYNTHROID, LEVOXYL) 75 mcg, oral, Daily    methotrexate (Trexall) 2.5 mg tablet 6 tablets, oral, Once Weekly        Last Recorded Vitals:  Vitals:    04/26/24 1349   BP: (!) 160/110   Pulse: 92   Weight: 60.8 kg (134 lb)       Review of Systems   Constitutional: Positive for malaise/fatigue.   Neurological:  Positive for dizziness and light-headedness.   All other systems reviewed and are negative.     Physical Exam:  Constitutional:       Appearance: Healthy appearance. Not in distress.   Neck:      Vascular: No JVR. JVD normal.   Pulmonary:      Effort: Pulmonary effort is normal.      Breath sounds: Normal breath sounds. No wheezing. No rhonchi. No rales.   Chest:      Chest wall: Not tender to palpatation.   Cardiovascular:      PMI at left midclavicular line. Normal rate. Regular rhythm. Normal S1. Normal S2.       Murmurs: There is no murmur.      No gallop.  No click. No rub.   Pulses:     Intact distal pulses.   Edema:     Peripheral edema absent.   Abdominal:      General: Bowel sounds are normal.      Palpations: Abdomen is soft.      Tenderness: There is no abdominal  tenderness.   Musculoskeletal: Normal range of motion.         General: No tenderness. Skin:     General: Skin is warm and dry.   Neurological:      General: No focal deficit present.      Mental Status: Alert and oriented to person, place and time.            Last Labs:  CBC -  Lab Results   Component Value Date    WBC 6.0 04/23/2024    HGB 13.6 04/23/2024    HCT 41.3 04/23/2024    MCV 88 04/23/2024     04/23/2024       CMP -  Lab Results   Component Value Date    CALCIUM 9.5 04/23/2024    PHOS 2.3 (L) 04/23/2024    PROT 7.3 04/23/2024    ALBUMIN 4.0 04/23/2024    AST 27 04/23/2024    ALT 20 04/23/2024    ALKPHOS 99 04/23/2024    BILITOT 0.7 04/23/2024       LIPID PANEL -   Lab Results   Component Value Date    CHOL 169 03/22/2024    TRIG 102 03/22/2024    HDL 45.1 03/22/2024    CHHDL 3.7 03/22/2024    LDLF 106 (H) 09/07/2022    VLDL 20 03/22/2024    NHDL 124 03/22/2024       RENAL FUNCTION PANEL -   Lab Results   Component Value Date    GLUCOSE 95 04/23/2024     04/23/2024    K 4.1 04/23/2024     04/23/2024    CO2 23 04/23/2024    ANIONGAP 12 04/23/2024    BUN 17 04/23/2024    CREATININE 0.75 04/23/2024    CALCIUM 9.5 04/23/2024    PHOS 2.3 (L) 04/23/2024    ALBUMIN 4.0 04/23/2024        Last Cardiology Tests:  03/28/2024 - Vascular Lab Carotid Artery Duplex    1. Right Carotid: Findings are consistent with less than 50% stenosis of the right proximal internal carotid artery. Laminar flow seen by color Doppler. Right external carotid artery appears patent with no evidence of stenosis. The right vertebral artery is patent with antegrade flow. No evidence of hemodynamically significant stenosis in the right subclavian artery.  2. Left Carotid: Findings are consistent with less than 50% stenosis of the left proximal internal carotid artery. Laminar flow seen by color Doppler. Left external carotid artery appears patent with no evidence of stenosis. The left vertebral artery is patent with antegrade  flow. No evidence of hemodynamically significant stenosis in the left subclavian artery.  3. Additional Findings: Tortuous vessels noted bilaterally.    10/09/2020- Stress Test  1. There is normal perfusion in all major segments. There is normal wall motion and normal left ventricular function . The gated ejection fraction is  74%(Normal over 50%).  2. No ECG changes from baseline. Non-diagnostic Stress Test.    10/09/2020 - TTE  1. The left ventricular systolic function is normal with a 65% estimated ejection fraction.  2. Moderately increased left ventricular septal thickness.  3. Spectral Doppler shows an impaired relaxation pattern of left ventricular diastolic filling.    04/27/2018 - TTE  1. Normal left ventricular size and regional systolic function with an ejection fraction of 65%.  2. Normal right ventricular size and systolic function.        Assessment/Plan   1) Dizziness, HTN  On diltiazem 30 mg BID, enalapril 10 mg BID, atorvastatin 80 mg daily  Stress 10/09/2020 negative for ischemia  TTE 10/09/2020 with LVEF 65%  Carotid U/S 03/28/2024 with <50% stenosis bilaterally  Per patient, following 2 prior orthopedic surgeries she had abnormal EKGs  Seen by PCP 03/2026 and told that she has a left-sided blockage  Prior to ED evaluation, BP was elevated in 180s systolic with associated dizziness/lightheadedness  Reports frequent episodes of dizziness/lightheadedness   BP persistently elevated  Reports easy fatigability  Denies CP, chest discomfort or SOB  Discontinue diltiazem   Start metoprolol tartrate 50 mg BID  Check echo  Check Lexiscan Cardiolite stress test        Scribe Attestation  By signing my name below, I, To Nevarez   attest that this documentation has been prepared under the direction and in the presence of Solo Calvo MD.

## 2024-04-26 ENCOUNTER — OFFICE VISIT (OUTPATIENT)
Dept: CARDIOLOGY | Facility: CLINIC | Age: 84
End: 2024-04-26
Payer: MEDICARE

## 2024-04-26 VITALS
WEIGHT: 134 LBS | BODY MASS INDEX: 23 KG/M2 | DIASTOLIC BLOOD PRESSURE: 110 MMHG | HEART RATE: 92 BPM | SYSTOLIC BLOOD PRESSURE: 160 MMHG

## 2024-04-26 DIAGNOSIS — R06.02 SHORTNESS OF BREATH: Primary | ICD-10-CM

## 2024-04-26 DIAGNOSIS — R94.31 EKG ABNORMALITY: ICD-10-CM

## 2024-04-26 DIAGNOSIS — I10 BENIGN ESSENTIAL HYPERTENSION: ICD-10-CM

## 2024-04-26 PROCEDURE — 3077F SYST BP >= 140 MM HG: CPT | Performed by: INTERNAL MEDICINE

## 2024-04-26 PROCEDURE — 93000 ELECTROCARDIOGRAM COMPLETE: CPT | Performed by: INTERNAL MEDICINE

## 2024-04-26 PROCEDURE — 1036F TOBACCO NON-USER: CPT | Performed by: INTERNAL MEDICINE

## 2024-04-26 PROCEDURE — 1160F RVW MEDS BY RX/DR IN RCRD: CPT | Performed by: INTERNAL MEDICINE

## 2024-04-26 PROCEDURE — 1159F MED LIST DOCD IN RCRD: CPT | Performed by: INTERNAL MEDICINE

## 2024-04-26 PROCEDURE — 3080F DIAST BP >= 90 MM HG: CPT | Performed by: INTERNAL MEDICINE

## 2024-04-26 PROCEDURE — 99203 OFFICE O/P NEW LOW 30 MIN: CPT | Performed by: INTERNAL MEDICINE

## 2024-04-26 RX ORDER — METOPROLOL TARTRATE 50 MG/1
50 TABLET ORAL 2 TIMES DAILY
Qty: 60 TABLET | Refills: 5 | Status: SHIPPED | OUTPATIENT
Start: 2024-04-26 | End: 2024-05-28 | Stop reason: SDUPTHER

## 2024-04-26 ASSESSMENT — ENCOUNTER SYMPTOMS
LIGHT-HEADEDNESS: 1
DIZZINESS: 1

## 2024-04-26 NOTE — LETTER
"April 26, 2024     David Geiger, APRN-CNP  [address]  [facility] [address]    Patient: Nina Dalal   YOB: 1940   Date of Visit: 4/26/2024       Dear Dr. David Geiger, APRN-CNP:    Thank you for referring Nina Dalal to me for evaluation. Below are my notes for this consultation.  If you have questions, please do not hesitate to call me. I look forward to following your patient along with you.       Sincerely,     Solo Calvo MD      CC: No Recipients  ______________________________________________________________________________________    Referred by Dr. Gary Geiger for Dizziness and HTN    Counseling:  The patient was counseled regarding diagnostic results, instructions for management, risk factor reductions, prognosis, patient and family education, impressions, risks and benefits of treatment options and importance of compliance with treatment.       History Of Present Illness:    Nina Dalal is a 84 y.o. female patient whose PMH is significant for hyperlipidemia, HTN, hypothyroidism, GERD, anemia, and rheumatoid arthritis. She presents today to establish cardiovascular care for the evaluation and management of dizziness and HTN. The patient was evaluated in the ED on 04/23/2024 for a chief complaint of dizziness. While in the ED, EKG showed NSR at a rate of 93 bpm, no ST segment changes, T wave inversion in lead V2 and  incomplete RBBB, labs were unremarkable, and CXR was negative. She was discharged home the same day for outpatient followup. Carotid U/S performed 03/28/2024 revealed less than 50% stenosis bilaterally. The patient states that following two prior orthopedic surgeries she was found to have abnormal EKGs. She was seen by her PCP on 03/26/2024 and was told that she had \"a blockage in the left side of my heart.\" Prior to  her ED presentation on 04/23/2024, her blood pressure was elevated in the 180s systolic range with associated " dizziness/lightheadedness. She reports frequent episodes of dizziness/lightheadedness and her BP is persistently elevated. She also reports easy fatigability. The patient denies any CP, chest discomfort or SOB.     Past Surgical History:  She has a past surgical history that includes Other surgical history (08/14/2020); Other surgical history (11/26/2019); Other surgical history (11/26/2019); and Other surgical history (10/26/2020).      Social History:  She reports that she has never smoked. She has never used smokeless tobacco. She reports that she does not currently use alcohol. She reports that she does not use drugs.    Family History:  Family History   Problem Relation Name Age of Onset   • Diabetes Mother     • Hypertension Mother     • Lung cancer Father          Allergies:  Amoxicillin    Outpatient Medications:  Current Outpatient Medications   Medication Instructions   • acetaminophen (TYLENOL) 1,000 mg, oral, Every 8 hours PRN   • ascorbic acid, vitamin C, 500 mg capsule oral   • atorvastatin (LIPITOR) 80 mg, oral, Nightly   • bacitracin ophthalmic ointment    • cholecalciferol (Vitamin D-3) 125 MCG (5000 UT) capsule oral   • cyanocobalamin (VITAMIN B-12) 250 mcg, oral, Daily   • cycloSPORINE (Restasis) 0.05 % ophthalmic emulsion    • dilTIAZem (CARDIZEM) 30 mg, oral, 2 times daily   • enalapril (VASOTEC) 10 mg, oral, 2 times daily   • folic acid (FOLVITE) 1 mg, oral, Daily   • lansoprazole (PREVACID) 30 mg, oral, Daily   • levothyroxine (SYNTHROID, LEVOXYL) 75 mcg, oral, Daily   • methotrexate (Trexall) 2.5 mg tablet 6 tablets, oral, Once Weekly        Last Recorded Vitals:  Vitals:    04/26/24 1349   BP: (!) 160/110   Pulse: 92   Weight: 60.8 kg (134 lb)       Review of Systems   Constitutional: Positive for malaise/fatigue.   Neurological:  Positive for dizziness and light-headedness.   All other systems reviewed and are negative.     Physical Exam:  Constitutional:       Appearance: Healthy  appearance. Not in distress.   Neck:      Vascular: No JVR. JVD normal.   Pulmonary:      Effort: Pulmonary effort is normal.      Breath sounds: Normal breath sounds. No wheezing. No rhonchi. No rales.   Chest:      Chest wall: Not tender to palpatation.   Cardiovascular:      PMI at left midclavicular line. Normal rate. Regular rhythm. Normal S1. Normal S2.       Murmurs: There is no murmur.      No gallop.  No click. No rub.   Pulses:     Intact distal pulses.   Edema:     Peripheral edema absent.   Abdominal:      General: Bowel sounds are normal.      Palpations: Abdomen is soft.      Tenderness: There is no abdominal tenderness.   Musculoskeletal: Normal range of motion.         General: No tenderness. Skin:     General: Skin is warm and dry.   Neurological:      General: No focal deficit present.      Mental Status: Alert and oriented to person, place and time.            Last Labs:  CBC -  Lab Results   Component Value Date    WBC 6.0 04/23/2024    HGB 13.6 04/23/2024    HCT 41.3 04/23/2024    MCV 88 04/23/2024     04/23/2024       CMP -  Lab Results   Component Value Date    CALCIUM 9.5 04/23/2024    PHOS 2.3 (L) 04/23/2024    PROT 7.3 04/23/2024    ALBUMIN 4.0 04/23/2024    AST 27 04/23/2024    ALT 20 04/23/2024    ALKPHOS 99 04/23/2024    BILITOT 0.7 04/23/2024       LIPID PANEL -   Lab Results   Component Value Date    CHOL 169 03/22/2024    TRIG 102 03/22/2024    HDL 45.1 03/22/2024    CHHDL 3.7 03/22/2024    LDLF 106 (H) 09/07/2022    VLDL 20 03/22/2024    NHDL 124 03/22/2024       RENAL FUNCTION PANEL -   Lab Results   Component Value Date    GLUCOSE 95 04/23/2024     04/23/2024    K 4.1 04/23/2024     04/23/2024    CO2 23 04/23/2024    ANIONGAP 12 04/23/2024    BUN 17 04/23/2024    CREATININE 0.75 04/23/2024    CALCIUM 9.5 04/23/2024    PHOS 2.3 (L) 04/23/2024    ALBUMIN 4.0 04/23/2024        Last Cardiology Tests:  03/28/2024 - Vascular Lab Carotid Artery Duplex    1. Right  Carotid: Findings are consistent with less than 50% stenosis of the right proximal internal carotid artery. Laminar flow seen by color Doppler. Right external carotid artery appears patent with no evidence of stenosis. The right vertebral artery is patent with antegrade flow. No evidence of hemodynamically significant stenosis in the right subclavian artery.  2. Left Carotid: Findings are consistent with less than 50% stenosis of the left proximal internal carotid artery. Laminar flow seen by color Doppler. Left external carotid artery appears patent with no evidence of stenosis. The left vertebral artery is patent with antegrade flow. No evidence of hemodynamically significant stenosis in the left subclavian artery.  3. Additional Findings: Tortuous vessels noted bilaterally.    10/09/2020- Stress Test  1. There is normal perfusion in all major segments. There is normal wall motion and normal left ventricular function . The gated ejection fraction is  74%(Normal over 50%).  2. No ECG changes from baseline. Non-diagnostic Stress Test.    10/09/2020 - TTE  1. The left ventricular systolic function is normal with a 65% estimated ejection fraction.  2. Moderately increased left ventricular septal thickness.  3. Spectral Doppler shows an impaired relaxation pattern of left ventricular diastolic filling.    04/27/2018 - TTE  1. Normal left ventricular size and regional systolic function with an ejection fraction of 65%.  2. Normal right ventricular size and systolic function.        Assessment/Plan  1) Dizziness, HTN  On diltiazem 30 mg BID, enalapril 10 mg BID, atorvastatin 80 mg daily  Stress 10/09/2020 negative for ischemia  TTE 10/09/2020 with LVEF 65%  Carotid U/S 03/28/2024 with <50% stenosis bilaterally  Per patient, following 2 prior orthopedic surgeries she had abnormal EKGs  Seen by PCP 03/2026 and told that she has a left-sided blockage  Prior to ED evaluation, BP was elevated in 180s systolic with associated  dizziness/lightheadedness  Reports frequent episodes of dizziness/lightheadedness   BP persistently elevated  Reports easy fatigability  Denies CP, chest discomfort or SOB  Discontinue diltiazem   Start metoprolol tartrate 50 mg BID  Check echo  Check Lexiscan Cardiolite stress test        Scribe Attestation  By signing my name below, I, To Nevarez   attest that this documentation has been prepared under the direction and in the presence of Solo Calvo MD.

## 2024-04-26 NOTE — PATIENT INSTRUCTIONS
Discontinue diltiazem.  Start metoprolol tartrate 50 mg twice daily. A prescription has been sent to your pharmacy.   Continue all other medications as prescribed.  Dr. Calvo has ordered an echocardiogram (ultrasound of the heart) to evaluate your heart function and structure.  Dr. Calvo has also ordered a stress test to ensure your heart is getting adequate blood flow and there is no evidence of any blockages within the heart arteries.    Followup with Dr. Calvo in 1 month.    If you have any questions or cardiac concerns, please call our office at [phone].

## 2024-05-22 ENCOUNTER — HOSPITAL ENCOUNTER (OUTPATIENT)
Dept: RADIOLOGY | Facility: HOSPITAL | Age: 84
Discharge: HOME | End: 2024-05-22
Payer: MEDICARE

## 2024-05-22 ENCOUNTER — HOSPITAL ENCOUNTER (OUTPATIENT)
Dept: CARDIOLOGY | Facility: HOSPITAL | Age: 84
Discharge: HOME | End: 2024-05-22
Payer: MEDICARE

## 2024-05-22 DIAGNOSIS — R94.31 EKG ABNORMALITY: ICD-10-CM

## 2024-05-22 DIAGNOSIS — R06.02 SHORTNESS OF BREATH: ICD-10-CM

## 2024-05-22 DIAGNOSIS — I10 BENIGN ESSENTIAL HYPERTENSION: ICD-10-CM

## 2024-05-22 PROCEDURE — 3430000001 HC RX 343 DIAGNOSTIC RADIOPHARMACEUTICALS: Performed by: INTERNAL MEDICINE

## 2024-05-22 PROCEDURE — 93017 CV STRESS TEST TRACING ONLY: CPT

## 2024-05-22 PROCEDURE — A9502 TC99M TETROFOSMIN: HCPCS | Performed by: INTERNAL MEDICINE

## 2024-05-22 PROCEDURE — 93016 CV STRESS TEST SUPVJ ONLY: CPT | Performed by: INTERNAL MEDICINE

## 2024-05-22 PROCEDURE — 93306 TTE W/DOPPLER COMPLETE: CPT

## 2024-05-22 PROCEDURE — 93018 CV STRESS TEST I&R ONLY: CPT | Performed by: INTERNAL MEDICINE

## 2024-05-22 PROCEDURE — 78452 HT MUSCLE IMAGE SPECT MULT: CPT | Performed by: INTERNAL MEDICINE

## 2024-05-22 PROCEDURE — 93306 TTE W/DOPPLER COMPLETE: CPT | Performed by: INTERNAL MEDICINE

## 2024-05-22 RX ORDER — REGADENOSON 0.08 MG/ML
0.4 INJECTION, SOLUTION INTRAVENOUS ONCE
Status: DISCONTINUED | OUTPATIENT
Start: 2024-05-22 | End: 2024-05-23 | Stop reason: HOSPADM

## 2024-05-22 RX ADMIN — TETROFOSMIN 30 MILLICURIE: 0.23 INJECTION, POWDER, LYOPHILIZED, FOR SOLUTION INTRAVENOUS at 11:25

## 2024-05-22 RX ADMIN — TETROFOSMIN 10 MILLICURIE: 0.23 INJECTION, POWDER, LYOPHILIZED, FOR SOLUTION INTRAVENOUS at 09:05

## 2024-05-23 LAB
AORTIC VALVE MEAN GRADIENT: 2.3 MMHG
AORTIC VALVE PEAK VELOCITY: 1.07 M/S
AV PEAK GRADIENT: 4.6 MMHG
AVA (PEAK VEL): 2.31 CM2
AVA (VTI): 2.16 CM2
EJECTION FRACTION APICAL 4 CHAMBER: 63.1
LEFT ATRIUM VOLUME AREA LENGTH INDEX BSA: 30.7 ML/M2
LEFT VENTRICLE INTERNAL DIMENSION DIASTOLE: 3.9 CM (ref 3.5–6)
LEFT VENTRICULAR OUTFLOW TRACT DIAMETER: 2.03 CM
LV EJECTION FRACTION BIPLANE: 68 %
MITRAL VALVE E/A RATIO: 1.04
MITRAL VALVE E/E' RATIO: 12.57
RIGHT VENTRICLE FREE WALL PEAK S': 8.73 CM/S
RIGHT VENTRICLE PEAK SYSTOLIC PRESSURE: 45.2 MMHG
TRICUSPID ANNULAR PLANE SYSTOLIC EXCURSION: 2.1 CM

## 2024-05-27 ENCOUNTER — APPOINTMENT (OUTPATIENT)
Dept: CARDIOLOGY | Facility: CLINIC | Age: 84
End: 2024-05-27
Payer: MEDICARE

## 2024-05-27 NOTE — PROGRESS NOTES
Counseling:  The patient was counseled regarding diagnostic results, instructions for management, risk factor reductions, prognosis, patient and family education, impressions, risks and benefits of treatment options and importance of compliance with treatment.      Chief Complaint:  The patient presents today for 1-month followup of dizziness, HTN and easy fatigability s/p echocardiogram and stress test.      History Of Present Illness:    Nina Dalal is a 84 y.o. female patient who presents today for 1-month followup of dizziness, HTN and easy fatigability s/p echocardiogram and stress test. Her PMH is significant for hyperlipidemia, HTN, hypothyroidism, GERD, anemia, and rheumatoid arthritis. On 05/22/2024, echocardiogram demonstrated an EF of 60-65% and mildly elevated RVSP, and stress testing was negative for ischemia. Today, the patient states that she is feeling well with improvement in her BP into the 130-135 systolic range. She attributes her elevated BP reading today to not taking her morning medications.     Past Surgical History:  She has a past surgical history that includes Other surgical history (08/14/2020); Other surgical history (11/26/2019); Other surgical history (11/26/2019); and Other surgical history (10/26/2020).      Social History:  She reports that she has never smoked. She has never used smokeless tobacco. She reports that she does not currently use alcohol. She reports that she does not use drugs.    Family History:  Family History   Problem Relation Name Age of Onset    Diabetes Mother      Hypertension Mother      Lung cancer Father          Allergies:  Amoxicillin    Outpatient Medications:  Current Outpatient Medications   Medication Instructions    acetaminophen (TYLENOL) 1,000 mg, oral, Every 8 hours PRN    atorvastatin (LIPITOR) 80 mg, oral, Nightly    enalapril (VASOTEC) 10 mg, oral, 2 times daily    folic acid (FOLVITE) 1 mg, oral, Daily    lansoprazole (PREVACID) 30 mg, oral,  Daily    levothyroxine (SYNTHROID, LEVOXYL) 75 mcg, oral, Daily    methotrexate (Trexall) 2.5 mg tablet 6 tablets, oral, Once Weekly    metoprolol tartrate (LOPRESSOR) 50 mg, oral, 2 times daily    mupirocin (Bactroban) 2 % ointment APPLY TO WOUND EVERY DAY TO TWICE DAILY        Last Recorded Vitals:  Vitals:    05/28/24 1001   BP: 164/82   Pulse: 80   Weight: 59.9 kg (132 lb)         Review of Systems   All other systems reviewed and are negative.     Physical Exam:  Constitutional:       Appearance: Healthy appearance. Not in distress.   Neck:      Vascular: No JVR. JVD normal.   Pulmonary:      Effort: Pulmonary effort is normal.      Breath sounds: Normal breath sounds. No wheezing. No rhonchi. No rales.   Chest:      Chest wall: Not tender to palpatation.   Cardiovascular:      PMI at left midclavicular line. Normal rate. Regular rhythm. Normal S1. Normal S2.       Murmurs: There is no murmur.      No gallop.  No click. No rub.   Pulses:     Intact distal pulses.   Edema:     Peripheral edema absent.   Abdominal:      General: Bowel sounds are normal.      Palpations: Abdomen is soft.      Tenderness: There is no abdominal tenderness.   Musculoskeletal: Normal range of motion.         General: No tenderness. Skin:     General: Skin is warm and dry.   Neurological:      General: No focal deficit present.      Mental Status: Alert and oriented to person, place and time.            Last Labs:  CBC -  Lab Results   Component Value Date    WBC 6.0 04/23/2024    HGB 13.6 04/23/2024    HCT 41.3 04/23/2024    MCV 88 04/23/2024     04/23/2024       CMP -  Lab Results   Component Value Date    CALCIUM 9.5 04/23/2024    PHOS 2.3 (L) 04/23/2024    PROT 7.3 04/23/2024    ALBUMIN 4.0 04/23/2024    AST 27 04/23/2024    ALT 20 04/23/2024    ALKPHOS 99 04/23/2024    BILITOT 0.7 04/23/2024       LIPID PANEL -   Lab Results   Component Value Date    CHOL 169 03/22/2024    TRIG 102 03/22/2024    HDL 45.1 03/22/2024    CHHDL  3.7 03/22/2024    LDLF 106 (H) 09/07/2022    VLDL 20 03/22/2024    NHDL 124 03/22/2024       RENAL FUNCTION PANEL -   Lab Results   Component Value Date    GLUCOSE 95 04/23/2024     04/23/2024    K 4.1 04/23/2024     04/23/2024    CO2 23 04/23/2024    ANIONGAP 12 04/23/2024    BUN 17 04/23/2024    CREATININE 0.75 04/23/2024    CALCIUM 9.5 04/23/2024    PHOS 2.3 (L) 04/23/2024    ALBUMIN 4.0 04/23/2024        Last Cardiology Tests:  05/22/2024 - TTE  1. Left ventricular systolic function is normal with a 60-65% estimated ejection fraction.  2. Mildly elevated RVSP.    05/22/2024 - Stress Test  1. Normal stress myocardial perfusion imaging in response to pharmacologic stress. Calculated ejection fraction is 69% without segmental wall motion abnormality seen.  2. No clinical or definite electrocardiographic evidence for ischemia at maximal infusion.     03/28/2024 - Vascular Lab Carotid Artery Duplex    1. Right Carotid: Findings are consistent with less than 50% stenosis of the right proximal internal carotid artery. Laminar flow seen by color Doppler. Right external carotid artery appears patent with no evidence of stenosis. The right vertebral artery is patent with antegrade flow. No evidence of hemodynamically significant stenosis in the right subclavian artery.  2. Left Carotid: Findings are consistent with less than 50% stenosis of the left proximal internal carotid artery. Laminar flow seen by color Doppler. Left external carotid artery appears patent with no evidence of stenosis. The left vertebral artery is patent with antegrade flow. No evidence of hemodynamically significant stenosis in the left subclavian artery.  3. Additional Findings: Tortuous vessels noted bilaterally.    10/09/2020- Stress Test  1. There is normal perfusion in all major segments. There is normal wall motion and normal left ventricular function . The gated ejection fraction is  74%(Normal over 50%).  2. No ECG changes from  baseline. Non-diagnostic Stress Test.    10/09/2020 - TTE  1. The left ventricular systolic function is normal with a 65% estimated ejection fraction.  2. Moderately increased left ventricular septal thickness.  3. Spectral Doppler shows an impaired relaxation pattern of left ventricular diastolic filling.    04/27/2018 - TTE  1. Normal left ventricular size and regional systolic function with an ejection fraction of 65%.  2. Normal right ventricular size and systolic function.     Diagnostic review: I have personally reviewed the result(s) of the Echocardiogram and Stress Test.        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  Prior to ED evaluation, BP was elevated in 180s systolic with associated dizziness/lightheadedness  Denies CP, chest discomfort or SOB  TTE 05/22/2024 with LVEF 60-65%, mildly elevated RVSP  Stress 05/22/2024 negative for ischemia  BP improved into the 130-135 systolic range  Dizziness and easy fatigability resolved  BP elevated today s/t missing morning medications   Limit dietary salt intake  Increase physical activity   Continue current medical Rx  Followup with Steph Davis NP, in 6 months      Scribe Attestation  By signing my name below, I, Sarah Santos Scribe   attest that this documentation has been prepared under the direction and in the presence of Solo Calvo MD.

## 2024-05-28 ENCOUNTER — OFFICE VISIT (OUTPATIENT)
Dept: CARDIOLOGY | Facility: CLINIC | Age: 84
End: 2024-05-28
Payer: MEDICARE

## 2024-05-28 VITALS
WEIGHT: 132 LBS | BODY MASS INDEX: 22.66 KG/M2 | DIASTOLIC BLOOD PRESSURE: 82 MMHG | SYSTOLIC BLOOD PRESSURE: 164 MMHG | HEART RATE: 80 BPM

## 2024-05-28 DIAGNOSIS — R06.02 SHORTNESS OF BREATH: ICD-10-CM

## 2024-05-28 DIAGNOSIS — I10 BENIGN ESSENTIAL HYPERTENSION: ICD-10-CM

## 2024-05-28 DIAGNOSIS — R94.31 EKG ABNORMALITY: ICD-10-CM

## 2024-05-28 PROCEDURE — 3079F DIAST BP 80-89 MM HG: CPT | Performed by: INTERNAL MEDICINE

## 2024-05-28 PROCEDURE — 3077F SYST BP >= 140 MM HG: CPT | Performed by: INTERNAL MEDICINE

## 2024-05-28 PROCEDURE — 1159F MED LIST DOCD IN RCRD: CPT | Performed by: INTERNAL MEDICINE

## 2024-05-28 PROCEDURE — 1160F RVW MEDS BY RX/DR IN RCRD: CPT | Performed by: INTERNAL MEDICINE

## 2024-05-28 PROCEDURE — 99213 OFFICE O/P EST LOW 20 MIN: CPT | Performed by: INTERNAL MEDICINE

## 2024-05-28 RX ORDER — METOPROLOL TARTRATE 50 MG/1
50 TABLET ORAL 2 TIMES DAILY
Qty: 180 TABLET | Refills: 3 | Status: SHIPPED | OUTPATIENT
Start: 2024-05-28 | End: 2025-05-28

## 2024-05-28 RX ORDER — MUPIROCIN 20 MG/G
OINTMENT TOPICAL
COMMUNITY
Start: 2024-05-20 | End: 2024-05-28 | Stop reason: WASHOUT

## 2024-05-28 NOTE — PATIENT INSTRUCTIONS
Continue all current medications as prescribed.  Limit dietary salt intake.  Increase physical activity.   Followup with Steph Davis NP, in 6 months.      If you have any questions or cardiac concerns, please call our office at [phone].

## 2024-05-28 NOTE — LETTER
May 28, 2024     David Geiger, APRN-CNP  [address]  [facility] [id number]    Patient: Nina Dalal   YOB: 1940   Date of Visit: 5/28/2024       Dear Dr. David Geiger, APRN-CNP:    Thank you for referring Nina Dalal to me for evaluation. Below are my notes for this consultation.  If you have questions, please do not hesitate to call me. I look forward to following your patient along with you.       Sincerely,     Solo Calvo MD      CC: No Recipients  ______________________________________________________________________________________    Counseling:  The patient was counseled regarding diagnostic results, instructions for management, risk factor reductions, prognosis, patient and family education, impressions, risks and benefits of treatment options and importance of compliance with treatment.      Chief Complaint:  The patient presents today for 1-month followup of dizziness, HTN and easy fatigability s/p echocardiogram and stress test.      History Of Present Illness:    Nina Dalal is a 84 y.o. female patient who presents today for 1-month followup of dizziness, HTN and easy fatigability s/p echocardiogram and stress test. Her PMH is significant for hyperlipidemia, HTN, hypothyroidism, GERD, anemia, and rheumatoid arthritis. On 05/22/2024, echocardiogram demonstrated an EF of 60-65% and mildly elevated RVSP, and stress testing was negative for ischemia. Today, the patient states that she is feeling well with improvement in her BP into the 130-135 systolic range. She attributes her elevated BP reading today to not taking her morning medications.     Past Surgical History:  She has a past surgical history that includes Other surgical history (08/14/2020); Other surgical history (11/26/2019); Other surgical history (11/26/2019); and Other surgical history (10/26/2020).      Social History:  She reports that she has never smoked. She has never  used smokeless tobacco. She reports that she does not currently use alcohol. She reports that she does not use drugs.    Family History:  Family History   Problem Relation Name Age of Onset   • Diabetes Mother     • Hypertension Mother     • Lung cancer Father          Allergies:  Amoxicillin    Outpatient Medications:  Current Outpatient Medications   Medication Instructions   • acetaminophen (TYLENOL) 1,000 mg, oral, Every 8 hours PRN   • atorvastatin (LIPITOR) 80 mg, oral, Nightly   • enalapril (VASOTEC) 10 mg, oral, 2 times daily   • folic acid (FOLVITE) 1 mg, oral, Daily   • lansoprazole (PREVACID) 30 mg, oral, Daily   • levothyroxine (SYNTHROID, LEVOXYL) 75 mcg, oral, Daily   • methotrexate (Trexall) 2.5 mg tablet 6 tablets, oral, Once Weekly   • metoprolol tartrate (LOPRESSOR) 50 mg, oral, 2 times daily   • mupirocin (Bactroban) 2 % ointment APPLY TO WOUND EVERY DAY TO TWICE DAILY        Last Recorded Vitals:  Vitals:    05/28/24 1001   BP: 164/82   Pulse: 80   Weight: 59.9 kg (132 lb)         Review of Systems   All other systems reviewed and are negative.     Physical Exam:  Constitutional:       Appearance: Healthy appearance. Not in distress.   Neck:      Vascular: No JVR. JVD normal.   Pulmonary:      Effort: Pulmonary effort is normal.      Breath sounds: Normal breath sounds. No wheezing. No rhonchi. No rales.   Chest:      Chest wall: Not tender to palpatation.   Cardiovascular:      PMI at left midclavicular line. Normal rate. Regular rhythm. Normal S1. Normal S2.       Murmurs: There is no murmur.      No gallop.  No click. No rub.   Pulses:     Intact distal pulses.   Edema:     Peripheral edema absent.   Abdominal:      General: Bowel sounds are normal.      Palpations: Abdomen is soft.      Tenderness: There is no abdominal tenderness.   Musculoskeletal: Normal range of motion.         General: No tenderness. Skin:     General: Skin is warm and dry.   Neurological:      General: No focal deficit  present.      Mental Status: Alert and oriented to person, place and time.            Last Labs:  CBC -  Lab Results   Component Value Date    WBC 6.0 04/23/2024    HGB 13.6 04/23/2024    HCT 41.3 04/23/2024    MCV 88 04/23/2024     04/23/2024       CMP -  Lab Results   Component Value Date    CALCIUM 9.5 04/23/2024    PHOS 2.3 (L) 04/23/2024    PROT 7.3 04/23/2024    ALBUMIN 4.0 04/23/2024    AST 27 04/23/2024    ALT 20 04/23/2024    ALKPHOS 99 04/23/2024    BILITOT 0.7 04/23/2024       LIPID PANEL -   Lab Results   Component Value Date    CHOL 169 03/22/2024    TRIG 102 03/22/2024    HDL 45.1 03/22/2024    CHHDL 3.7 03/22/2024    LDLF 106 (H) 09/07/2022    VLDL 20 03/22/2024    NHDL 124 03/22/2024       RENAL FUNCTION PANEL -   Lab Results   Component Value Date    GLUCOSE 95 04/23/2024     04/23/2024    K 4.1 04/23/2024     04/23/2024    CO2 23 04/23/2024    ANIONGAP 12 04/23/2024    BUN 17 04/23/2024    CREATININE 0.75 04/23/2024    CALCIUM 9.5 04/23/2024    PHOS 2.3 (L) 04/23/2024    ALBUMIN 4.0 04/23/2024        Last Cardiology Tests:  05/22/2024 - TTE  1. Left ventricular systolic function is normal with a 60-65% estimated ejection fraction.  2. Mildly elevated RVSP.    05/22/2024 - Stress Test  1. Normal stress myocardial perfusion imaging in response to pharmacologic stress. Calculated ejection fraction is 69% without segmental wall motion abnormality seen.  2. No clinical or definite electrocardiographic evidence for ischemia at maximal infusion.     03/28/2024 - Vascular Lab Carotid Artery Duplex    1. Right Carotid: Findings are consistent with less than 50% stenosis of the right proximal internal carotid artery. Laminar flow seen by color Doppler. Right external carotid artery appears patent with no evidence of stenosis. The right vertebral artery is patent with antegrade flow. No evidence of hemodynamically significant stenosis in the right subclavian artery.  2. Left Carotid:  Findings are consistent with less than 50% stenosis of the left proximal internal carotid artery. Laminar flow seen by color Doppler. Left external carotid artery appears patent with no evidence of stenosis. The left vertebral artery is patent with antegrade flow. No evidence of hemodynamically significant stenosis in the left subclavian artery.  3. Additional Findings: Tortuous vessels noted bilaterally.    10/09/2020- Stress Test  1. There is normal perfusion in all major segments. There is normal wall motion and normal left ventricular function . The gated ejection fraction is  74%(Normal over 50%).  2. No ECG changes from baseline. Non-diagnostic Stress Test.    10/09/2020 - TTE  1. The left ventricular systolic function is normal with a 65% estimated ejection fraction.  2. Moderately increased left ventricular septal thickness.  3. Spectral Doppler shows an impaired relaxation pattern of left ventricular diastolic filling.    04/27/2018 - TTE  1. Normal left ventricular size and regional systolic function with an ejection fraction of 65%.  2. Normal right ventricular size and systolic function.     Diagnostic review: I have personally reviewed the result(s) of the Echocardiogram and Stress Test.        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  Prior to ED evaluation, BP was elevated in 180s systolic with associated dizziness/lightheadedness  Denies CP, chest discomfort or SOB  TTE 05/22/2024 with LVEF 60-65%, mildly elevated RVSP  Stress 05/22/2024 negative for ischemia  BP improved into the 130-135 systolic  range  Dizziness and easy fatigability resolved  BP elevated today s/t missing morning medications   Limit dietary salt intake  Increase physical activity   Continue current medical Rx  Followup with Steph Davis NP, in 6 months      Scribe Attestation  By signing my name below, I, Sarah Santos, Scribshalonda   attest that this documentation has been prepared under the direction and in the presence of Solo Calvo MD.

## 2024-06-17 ENCOUNTER — APPOINTMENT (OUTPATIENT)
Dept: CARDIOLOGY | Facility: CLINIC | Age: 84
End: 2024-06-17
Payer: MEDICARE

## 2024-09-26 ENCOUNTER — APPOINTMENT (OUTPATIENT)
Dept: PRIMARY CARE | Facility: CLINIC | Age: 84
End: 2024-09-26
Payer: MEDICARE

## 2024-09-26 VITALS
HEIGHT: 64 IN | HEART RATE: 80 BPM | DIASTOLIC BLOOD PRESSURE: 80 MMHG | SYSTOLIC BLOOD PRESSURE: 140 MMHG | WEIGHT: 134.8 LBS | OXYGEN SATURATION: 98 % | RESPIRATION RATE: 16 BRPM | BODY MASS INDEX: 23.01 KG/M2

## 2024-09-26 DIAGNOSIS — E78.5 HYPERLIPIDEMIA, UNSPECIFIED HYPERLIPIDEMIA TYPE: ICD-10-CM

## 2024-09-26 DIAGNOSIS — Z00.00 MEDICARE ANNUAL WELLNESS VISIT, SUBSEQUENT: ICD-10-CM

## 2024-09-26 DIAGNOSIS — D64.9 ANEMIA, UNSPECIFIED TYPE: ICD-10-CM

## 2024-09-26 DIAGNOSIS — K21.9 CHRONIC GERD: ICD-10-CM

## 2024-09-26 DIAGNOSIS — I10 HYPERTENSION, ESSENTIAL, BENIGN: ICD-10-CM

## 2024-09-26 DIAGNOSIS — E55.9 VITAMIN D DEFICIENCY: ICD-10-CM

## 2024-09-26 DIAGNOSIS — E03.9 HYPOTHYROIDISM, UNSPECIFIED TYPE: Primary | ICD-10-CM

## 2024-09-26 DIAGNOSIS — M06.9 RHEUMATOID ARTHRITIS INVOLVING MULTIPLE SITES, UNSPECIFIED WHETHER RHEUMATOID FACTOR PRESENT (MULTI): ICD-10-CM

## 2024-09-26 PROBLEM — J01.90 ACUTE SINUSITIS, UNSPECIFIED: Status: RESOLVED | Noted: 2023-02-09 | Resolved: 2024-09-26

## 2024-09-26 PROBLEM — J01.90 ACUTE SINUSITIS: Status: RESOLVED | Noted: 2023-02-09 | Resolved: 2024-09-26

## 2024-09-26 PROBLEM — N39.0 RECURRENT URINARY TRACT INFECTION: Status: RESOLVED | Noted: 2023-02-09 | Resolved: 2024-09-26

## 2024-09-26 PROBLEM — Z93.3 PRESENCE OF COLOSTOMY (MULTI): Status: RESOLVED | Noted: 2023-02-09 | Resolved: 2024-09-26

## 2024-09-26 PROBLEM — J06.9 URI (UPPER RESPIRATORY INFECTION): Status: RESOLVED | Noted: 2023-02-09 | Resolved: 2024-09-26

## 2024-09-26 PROBLEM — K57.32 DIVERTICULITIS, COLON: Status: RESOLVED | Noted: 2023-02-09 | Resolved: 2024-09-26

## 2024-09-26 PROBLEM — R94.31 ECG ABNORMAL: Status: RESOLVED | Noted: 2023-02-09 | Resolved: 2024-09-26

## 2024-09-26 PROCEDURE — 1160F RVW MEDS BY RX/DR IN RCRD: CPT | Performed by: NURSE PRACTITIONER

## 2024-09-26 PROCEDURE — 1159F MED LIST DOCD IN RCRD: CPT | Performed by: NURSE PRACTITIONER

## 2024-09-26 PROCEDURE — 3079F DIAST BP 80-89 MM HG: CPT | Performed by: NURSE PRACTITIONER

## 2024-09-26 PROCEDURE — 99214 OFFICE O/P EST MOD 30 MIN: CPT | Performed by: NURSE PRACTITIONER

## 2024-09-26 PROCEDURE — 1036F TOBACCO NON-USER: CPT | Performed by: NURSE PRACTITIONER

## 2024-09-26 PROCEDURE — 3077F SYST BP >= 140 MM HG: CPT | Performed by: NURSE PRACTITIONER

## 2024-09-26 PROCEDURE — 1126F AMNT PAIN NOTED NONE PRSNT: CPT | Performed by: NURSE PRACTITIONER

## 2024-09-26 RX ORDER — ENALAPRIL MALEATE 10 MG/1
10 TABLET ORAL 2 TIMES DAILY
Qty: 180 TABLET | Refills: 1 | Status: SHIPPED | OUTPATIENT
Start: 2024-09-26 | End: 2025-03-25

## 2024-09-26 ASSESSMENT — ANXIETY QUESTIONNAIRES
4. TROUBLE RELAXING: NOT AT ALL
3. WORRYING TOO MUCH ABOUT DIFFERENT THINGS: NOT AT ALL
6. BECOMING EASILY ANNOYED OR IRRITABLE: NOT AT ALL
7. FEELING AFRAID AS IF SOMETHING AWFUL MIGHT HAPPEN: NOT AT ALL
1. FEELING NERVOUS, ANXIOUS, OR ON EDGE: NOT AT ALL
2. NOT BEING ABLE TO STOP OR CONTROL WORRYING: NOT AT ALL
5. BEING SO RESTLESS THAT IT IS HARD TO SIT STILL: NOT AT ALL
GAD7 TOTAL SCORE: 0

## 2024-09-26 ASSESSMENT — ENCOUNTER SYMPTOMS
DEPRESSION: 0
OCCASIONAL FEELINGS OF UNSTEADINESS: 0
LOSS OF SENSATION IN FEET: 0

## 2024-09-26 ASSESSMENT — COLUMBIA-SUICIDE SEVERITY RATING SCALE - C-SSRS
6. HAVE YOU EVER DONE ANYTHING, STARTED TO DO ANYTHING, OR PREPARED TO DO ANYTHING TO END YOUR LIFE?: NO
1. IN THE PAST MONTH, HAVE YOU WISHED YOU WERE DEAD OR WISHED YOU COULD GO TO SLEEP AND NOT WAKE UP?: NO

## 2024-09-26 ASSESSMENT — PAIN SCALES - GENERAL: PAINLEVEL: 0-NO PAIN

## 2024-09-26 NOTE — PROGRESS NOTES
"Subjective   Patient ID: Nina Dalal is a 84 y.o. female who presents for Follow-up (No new concerns).    Patient is following up for management of multiple chronic medical diseases.  She is compliant with her medications with no side effect noted.  Reports feeling great and denies acute medical complaint.         Review of Systems   All other systems reviewed and are negative.      Objective   /80 (BP Location: Left arm, Patient Position: Sitting, BP Cuff Size: Adult)   Pulse 80   Resp 16   Ht 1.626 m (5' 4\")   Wt 61.1 kg (134 lb 12.8 oz)   SpO2 98%   BMI 23.14 kg/m²     Physical Exam  Constitutional:       Appearance: Normal appearance. She is normal weight.   HENT:      Head: Normocephalic and atraumatic.      Right Ear: External ear normal.      Left Ear: External ear normal.      Nose: Nose normal.      Mouth/Throat:      Mouth: Mucous membranes are moist.   Cardiovascular:      Rate and Rhythm: Normal rate and regular rhythm.      Pulses: Normal pulses.      Heart sounds: Normal heart sounds.   Pulmonary:      Effort: Pulmonary effort is normal.      Breath sounds: Normal breath sounds.   Abdominal:      General: Abdomen is flat. Bowel sounds are normal.      Palpations: Abdomen is soft.   Musculoskeletal:      Cervical back: Neck supple.   Skin:     General: Skin is warm and dry.   Neurological:      General: No focal deficit present.      Mental Status: She is alert and oriented to person, place, and time.   Psychiatric:         Mood and Affect: Mood normal.         Behavior: Behavior normal.         Thought Content: Thought content normal.         Judgment: Judgment normal.         Assessment/Plan   Problem List Items Addressed This Visit       Hyperlipidemia    Hypertension, essential, benign          "

## 2024-09-26 NOTE — PATIENT INSTRUCTIONS
Continue taking all current medications as prescribed and complete labs a few days prior to 6-month follow-up annual Medicare wellness exam.

## 2024-11-25 ASSESSMENT — ENCOUNTER SYMPTOMS
ORTHOPNEA: 0
PALPITATIONS: 0
CHILLS: 0
FEVER: 0
SHORTNESS OF BREATH: 0
SYNCOPE: 0
COUGH: 0
IRREGULAR HEARTBEAT: 0
NAUSEA: 0
DYSPNEA ON EXERTION: 0
VOMITING: 0
ALTERED MENTAL STATUS: 0
WHEEZING: 0
HEMATOCHEZIA: 0
NEAR-SYNCOPE: 0
HEMATURIA: 0

## 2024-11-25 NOTE — PATIENT INSTRUCTIONS
Recommend Mediterranean style of eating  Continue current medications  Check lab work as ordered by PCP  Follow-up with Dr. Calvo in 6 months  If you have any questions or cardiac concerns, please call our office at [phone].

## 2024-11-25 NOTE — PROGRESS NOTES
Chief Complaint/Reason for Visit:  No chief complaint on file. 6 month cardiovascular follow up    History Of Present Illness:    Nina Dalal is a 84 y.o. female that presents to the office for 6 month follow up.    Taking medications as prescribed.     PMH is significant for hyperlipidemia, HTN, hypothyroidism, GERD, anemia, and rheumatoid arthritis.     Overall feeling well.  Lightheadedness and dizziness has resolved.    Past Medical History:  She has a past medical history of Nontoxic multinodular goiter, Personal history of other diseases of the digestive system, Personal history of other diseases of the digestive system (08/14/2020), and Personal history of other specified conditions.    Past Surgical History:  She has a past surgical history that includes Other surgical history (08/14/2020); Other surgical history (11/26/2019); Other surgical history (11/26/2019); and Other surgical history (10/26/2020).      Social History:  She reports that she has never smoked. She has never used smokeless tobacco. She reports that she does not currently use alcohol. She reports that she does not use drugs.    Family History:  Family History   Problem Relation Name Age of Onset    Diabetes Mother      Hypertension Mother      Lung cancer Father          Allergies:  Amoxicillin    Review of Systems   Constitutional: Negative for chills and fever.   Cardiovascular:  Negative for chest pain, dyspnea on exertion, irregular heartbeat, leg swelling, near-syncope, orthopnea, palpitations and syncope.   Respiratory:  Negative for cough, shortness of breath and wheezing.    Gastrointestinal:  Negative for hematochezia, melena, nausea and vomiting.   Genitourinary:  Negative for hematuria.   Neurological:  Negative for dizziness and light-headedness.   Psychiatric/Behavioral:  Negative for altered mental status.        Objective      Vitals reviewed.   Constitutional:       Appearance: Not in distress.   Neck:      Vascular: No  carotid bruit.   Pulmonary:      Effort: Pulmonary effort is normal.      Breath sounds: Normal breath sounds.   Cardiovascular:      PMI at left midclavicular line. Normal rate. Regular rhythm. S1 with normal intensity. S2 with normal intensity.       Murmurs: There is no murmur.   Edema:     Peripheral edema absent.   Abdominal:      General: Bowel sounds are normal.   Neurological:      Mental Status: Alert and oriented to person, place and time.       Current Outpatient Medications   Medication Instructions    acetaminophen (TYLENOL) 1,000 mg, Every 8 hours PRN    atorvastatin (LIPITOR) 80 mg, oral, Nightly    enalapril (VASOTEC) 10 mg, oral, 2 times daily    folic acid (FOLVITE) 1 mg, oral, Daily    lansoprazole (PREVACID) 30 mg, oral, Daily    levothyroxine (SYNTHROID, LEVOXYL) 75 mcg, oral, Daily    methotrexate (Trexall) 2.5 mg tablet 6 tablets, Once Weekly    metoprolol tartrate (LOPRESSOR) 50 mg, oral, 2 times daily        Reviewed the following Labs:  CBC -  Lab Results   Component Value Date    WBC 6.0 04/23/2024    HGB 13.6 04/23/2024    HCT 41.3 04/23/2024    MCV 88 04/23/2024     04/23/2024       RENAL FUNCTION PANEL -   Lab Results   Component Value Date    GLUCOSE 95 04/23/2024     04/23/2024    K 4.1 04/23/2024     04/23/2024    CO2 23 04/23/2024    ANIONGAP 12 04/23/2024    BUN 17 04/23/2024    CREATININE 0.75 04/23/2024    CALCIUM 9.5 04/23/2024    PHOS 2.3 (L) 04/23/2024    ALBUMIN 4.0 04/23/2024        CMP -  Lab Results   Component Value Date    CALCIUM 9.5 04/23/2024    PHOS 2.3 (L) 04/23/2024    PROT 7.3 04/23/2024    ALBUMIN 4.0 04/23/2024    AST 27 04/23/2024    ALT 20 04/23/2024    ALKPHOS 99 04/23/2024    BILITOT 0.7 04/23/2024       LIPID PANEL -   Lab Results   Component Value Date    CHOL 169 03/22/2024    TRIG 102 03/22/2024    HDL 45.1 03/22/2024    CHHDL 3.7 03/22/2024    LDLF 106 (H) 09/07/2022    VLDL 20 03/22/2024    NHDL 124 03/22/2024     Lab Results  "  Component Value Date    LDLCALC 104 (H) 03/22/2024       No results found for: \"BNP\", \"HGBA1C\"    Lab Results   Component Value Date    TSH 2.35 03/22/2024       No results found for this or any previous visit.     Reviewed the following Cardiology Tests:    Echo 5/22/24:    1. Left ventricular systolic function is normal with a 60-65% estimated ejection fraction.   2. Mildly elevated RVSP.    Stress test 5/22/24:   1. No clinical or definite electrocardiographic evidence for ischemia at maximal infusion.   2. Correlate with myocardial perfusion imaging results.   3. Nuclear image results are reported separately.  1. Normal stress myocardial perfusion imaging in response to   pharmacologic stress.   2. Calculated ejection fraction is 69% without segmental wall motion   abnormality seen.      Carotid duplex 3/28/24:   Right Carotid: Findings are consistent with less than 50% stenosis of the right proximal internal carotid artery. Laminar flow seen by color Doppler. Right external carotid artery appears patent with no evidence of stenosis. The right vertebral artery is patent with antegrade flow. No evidence of hemodynamically significant stenosis in the right subclavian artery.  Left Carotid: Findings are consistent with less than 50% stenosis of the left proximal internal carotid artery. Laminar flow seen by color Doppler. Left external carotid artery appears patent with no evidence of stenosis. The left vertebral artery is patent with antegrade flow. No evidence of hemodynamically significant stenosis in the left subclavian artery.  Additional Findings:  Tortuous vessels noted bilaterally.    Visit Vitals  BP (!) 132/92   Pulse 65   Ht 1.626 m (5' 4\")   Wt 60.3 kg (133 lb)   LMP  (LMP Unknown)   BMI 22.83 kg/m²   OB Status Postmenopausal   Smoking Status Never   BSA 1.65 m²       Assessment/Plan   The primary encounter diagnosis was Hypertension, essential, benign. Diagnoses of EKG abnormality, Shortness of " breath, and Benign essential hypertension were also pertinent to this visit.    1. HTN   Stable  Continue current antihypertensives: enalapril 10 mg BID and metoprolol tartrate 50 mg BID   Stress test May 2024 with no ischemia  TTE May 2024 with LVEF 60-65%  Check repeat lab work as ordered by PCP    Steph Davis, APRN-CNP

## 2024-12-02 ENCOUNTER — APPOINTMENT (OUTPATIENT)
Dept: CARDIOLOGY | Facility: CLINIC | Age: 84
End: 2024-12-02
Payer: MEDICARE

## 2024-12-02 VITALS
WEIGHT: 133 LBS | DIASTOLIC BLOOD PRESSURE: 92 MMHG | SYSTOLIC BLOOD PRESSURE: 132 MMHG | HEIGHT: 64 IN | HEART RATE: 65 BPM | BODY MASS INDEX: 22.71 KG/M2

## 2024-12-02 DIAGNOSIS — R94.31 EKG ABNORMALITY: ICD-10-CM

## 2024-12-02 DIAGNOSIS — I10 BENIGN ESSENTIAL HYPERTENSION: ICD-10-CM

## 2024-12-02 DIAGNOSIS — R06.02 SHORTNESS OF BREATH: ICD-10-CM

## 2024-12-02 DIAGNOSIS — I10 HYPERTENSION, ESSENTIAL, BENIGN: Primary | ICD-10-CM

## 2024-12-02 PROCEDURE — 1160F RVW MEDS BY RX/DR IN RCRD: CPT | Performed by: NURSE PRACTITIONER

## 2024-12-02 PROCEDURE — 3080F DIAST BP >= 90 MM HG: CPT | Performed by: NURSE PRACTITIONER

## 2024-12-02 PROCEDURE — 99213 OFFICE O/P EST LOW 20 MIN: CPT | Performed by: NURSE PRACTITIONER

## 2024-12-02 PROCEDURE — 1159F MED LIST DOCD IN RCRD: CPT | Performed by: NURSE PRACTITIONER

## 2024-12-02 PROCEDURE — 3075F SYST BP GE 130 - 139MM HG: CPT | Performed by: NURSE PRACTITIONER

## 2024-12-02 PROCEDURE — 1036F TOBACCO NON-USER: CPT | Performed by: NURSE PRACTITIONER

## 2024-12-02 RX ORDER — METOPROLOL TARTRATE 50 MG/1
50 TABLET ORAL 2 TIMES DAILY
Qty: 180 TABLET | Refills: 3 | Status: SHIPPED | OUTPATIENT
Start: 2024-12-02 | End: 2025-12-02

## 2024-12-02 ASSESSMENT — ENCOUNTER SYMPTOMS
DIZZINESS: 0
LIGHT-HEADEDNESS: 0

## 2024-12-10 PROCEDURE — 88305 TISSUE EXAM BY PATHOLOGIST: CPT | Performed by: DERMATOLOGY

## 2024-12-13 ENCOUNTER — LAB REQUISITION (OUTPATIENT)
Dept: DERMATOPATHOLOGY | Facility: CLINIC | Age: 84
End: 2024-12-13
Payer: MEDICARE

## 2024-12-13 DIAGNOSIS — L98.9 DISORDER OF THE SKIN AND SUBCUTANEOUS TISSUE, UNSPECIFIED: ICD-10-CM

## 2024-12-17 LAB
LABORATORY COMMENT REPORT: NORMAL
PATH REPORT.FINAL DX SPEC: NORMAL
PATH REPORT.GROSS SPEC: NORMAL
PATH REPORT.MICROSCOPIC SPEC OTHER STN: NORMAL
PATH REPORT.RELEVANT HX SPEC: NORMAL
PATH REPORT.TOTAL CANCER: NORMAL

## 2025-02-24 ENCOUNTER — OFFICE VISIT (OUTPATIENT)
Dept: URGENT CARE | Age: 85
End: 2025-02-24
Payer: MEDICARE

## 2025-02-24 VITALS
HEART RATE: 94 BPM | OXYGEN SATURATION: 97 % | RESPIRATION RATE: 18 BRPM | TEMPERATURE: 98.8 F | SYSTOLIC BLOOD PRESSURE: 107 MMHG | DIASTOLIC BLOOD PRESSURE: 64 MMHG

## 2025-02-24 DIAGNOSIS — J01.90 ACUTE SINUSITIS, RECURRENCE NOT SPECIFIED, UNSPECIFIED LOCATION: Primary | ICD-10-CM

## 2025-02-24 PROCEDURE — 1160F RVW MEDS BY RX/DR IN RCRD: CPT

## 2025-02-24 PROCEDURE — 3074F SYST BP LT 130 MM HG: CPT

## 2025-02-24 PROCEDURE — 1036F TOBACCO NON-USER: CPT

## 2025-02-24 PROCEDURE — 99203 OFFICE O/P NEW LOW 30 MIN: CPT

## 2025-02-24 PROCEDURE — 3078F DIAST BP <80 MM HG: CPT

## 2025-02-24 PROCEDURE — 1159F MED LIST DOCD IN RCRD: CPT

## 2025-02-24 RX ORDER — DOXYCYCLINE 100 MG/1
100 CAPSULE ORAL 2 TIMES DAILY
Qty: 14 CAPSULE | Refills: 0 | Status: SHIPPED | OUTPATIENT
Start: 2025-02-24 | End: 2025-02-24

## 2025-02-24 RX ORDER — AZITHROMYCIN 250 MG/1
TABLET, FILM COATED ORAL
Qty: 6 TABLET | Refills: 0 | Status: SHIPPED | OUTPATIENT
Start: 2025-02-24 | End: 2025-03-01

## 2025-02-24 ASSESSMENT — ENCOUNTER SYMPTOMS
CONSTITUTIONAL NEGATIVE: 1
COUGH: 1
SORE THROAT: 0
CARDIOVASCULAR NEGATIVE: 1

## 2025-02-24 NOTE — PATIENT INSTRUCTIONS
Consider taking Mucinex for congestion. Make sure to drink plenty of fluids while taking this medication as it increases its effectiveness.     Consider Zyrtec or Claritin    Consider Flonase Nasal Spray    Consider Corcedin BP for high blood pressure.    Use throat lozenges, buckwheat honey, gargling salt water to help relieve sore throat symptoms.     Recommend inhaling steam from a hot shower or hot bath as well as using saline sinus rinse for congestion. Recommend Liu Med sinus rinse. Make sure to use bottled or distilled water.

## 2025-02-24 NOTE — PROGRESS NOTES
Subjective   Patient ID: Nina Dalal is a 84 y.o. female. They present today with a chief complaint of Cough.    History of Present Illness  84-year-old female presents to clinic today with complaints of nasal congestion and cough.  Patient states that this been ongoing for 2 weeks.  She denies sore throat or ear pain.  She states she originally had a fever.  She has been taking Coricidin with little relief.      Cough  Pertinent negatives include no ear pain or sore throat.       Past Medical History  Allergies as of 02/24/2025 - Reviewed 02/24/2025   Allergen Reaction Noted    Amoxicillin Rash 02/09/2023       (Not in a hospital admission)       Past Medical History:   Diagnosis Date    Nontoxic multinodular goiter     Multinodular goiter    Personal history of other diseases of the digestive system     History of gastroesophageal reflux (GERD)    Personal history of other diseases of the digestive system 08/14/2020    History of diverticulitis    Personal history of other specified conditions     History of fibrocystic disease of breast       Past Surgical History:   Procedure Laterality Date    OTHER SURGICAL HISTORY  08/14/2020    Sigmoidectomy    OTHER SURGICAL HISTORY  11/26/2019    Hysterectomy total abdominal with removal of both ovaries    OTHER SURGICAL HISTORY  11/26/2019    Cataract surgery    OTHER SURGICAL HISTORY  10/26/2020    Resection        reports that she has never smoked. She has never used smokeless tobacco. She reports that she does not currently use alcohol. She reports that she does not use drugs.    Review of Systems  Review of Systems   Constitutional: Negative.    HENT:  Positive for congestion. Negative for ear pain and sore throat.    Respiratory:  Positive for cough.    Cardiovascular: Negative.                                   Objective    Vitals:    02/24/25 0928   BP: 107/64   Pulse: 94   Resp: 18   Temp: 37.1 °C (98.8 °F)   SpO2: 97%     No LMP recorded (lmp unknown). Patient  is postmenopausal.    Physical Exam  Constitutional:       General: She is not in acute distress.     Appearance: Normal appearance.   Cardiovascular:      Rate and Rhythm: Normal rate and regular rhythm.      Heart sounds: Normal heart sounds.   Pulmonary:      Effort: Pulmonary effort is normal.      Breath sounds: Normal breath sounds.   Neurological:      Mental Status: She is alert.         Procedures    Point of Care Test & Imaging Results from this visit  No results found for this visit on 02/24/25.   No results found.    Diagnostic study results (if any) were reviewed by Quintin Moreno PA-C.    Assessment/Plan   Allergies, medications, history, and pertinent labs/EKGs/Imaging reviewed by Quintin Moreno PA-C.     Medical Decision Making  Patient pleasant cooperative on examination.    Cardiopulmonary exam within normal limits.    No acute abnormality noted.  Vital signs stable.    I did start patient on azithromycin for suspected sinusitis.    Advised to continue with Coricidin advised on proper over-the-counter medications.    Symptoms persist please follow-up with primary care.    Orders and Diagnoses  Diagnoses and all orders for this visit:  Acute sinusitis, recurrence not specified, unspecified location  -     azithromycin (Zithromax) 250 mg tablet; Take 2 tabs (500 mg) by mouth today, than 1 daily for 4 days.      Medical Admin Record      Patient disposition: Home    Electronically signed by Quintin Moreno PA-C  10:51 AM

## 2025-03-22 DIAGNOSIS — E78.5 HYPERLIPIDEMIA, UNSPECIFIED HYPERLIPIDEMIA TYPE: ICD-10-CM

## 2025-03-24 RX ORDER — ATORVASTATIN CALCIUM 80 MG/1
80 TABLET, FILM COATED ORAL NIGHTLY
Qty: 90 TABLET | Refills: 3 | Status: SHIPPED | OUTPATIENT
Start: 2025-03-24

## 2025-03-25 LAB
ALBUMIN SERPL-MCNC: 4 G/DL (ref 3.6–5.1)
ALP SERPL-CCNC: 82 U/L (ref 37–153)
ALT SERPL-CCNC: 17 U/L (ref 6–29)
ANION GAP SERPL CALCULATED.4IONS-SCNC: 8 MMOL/L (CALC) (ref 7–17)
AST SERPL-CCNC: 24 U/L (ref 10–35)
BILIRUB SERPL-MCNC: 0.6 MG/DL (ref 0.2–1.2)
BUN SERPL-MCNC: 15 MG/DL (ref 7–25)
CALCIUM SERPL-MCNC: 9.2 MG/DL (ref 8.6–10.4)
CHLORIDE SERPL-SCNC: 104 MMOL/L (ref 98–110)
CHOLEST SERPL-MCNC: 167 MG/DL
CHOLEST/HDLC SERPL: 3.4 (CALC)
CO2 SERPL-SCNC: 27 MMOL/L (ref 20–32)
CREAT SERPL-MCNC: 0.72 MG/DL (ref 0.6–0.95)
EGFRCR SERPLBLD CKD-EPI 2021: 81 ML/MIN/1.73M2
GLUCOSE SERPL-MCNC: 108 MG/DL (ref 65–99)
HDLC SERPL-MCNC: 49 MG/DL
LDLC SERPL CALC-MCNC: 98 MG/DL (CALC)
NONHDLC SERPL-MCNC: 118 MG/DL (CALC)
POTASSIUM SERPL-SCNC: 4.4 MMOL/L (ref 3.5–5.3)
PROT SERPL-MCNC: 6.8 G/DL (ref 6.1–8.1)
SODIUM SERPL-SCNC: 139 MMOL/L (ref 135–146)
TRIGL SERPL-MCNC: 104 MG/DL

## 2025-03-26 ENCOUNTER — APPOINTMENT (OUTPATIENT)
Dept: PRIMARY CARE | Facility: CLINIC | Age: 85
End: 2025-03-26
Payer: MEDICARE

## 2025-03-26 DIAGNOSIS — Z00.00 MEDICARE ANNUAL WELLNESS VISIT, SUBSEQUENT: Primary | ICD-10-CM

## 2025-03-26 DIAGNOSIS — E53.8 FOLIC ACID DEFICIENCY: ICD-10-CM

## 2025-03-26 DIAGNOSIS — M06.9 RHEUMATOID ARTHRITIS, INVOLVING UNSPECIFIED SITE, UNSPECIFIED WHETHER RHEUMATOID FACTOR PRESENT (MULTI): ICD-10-CM

## 2025-03-26 DIAGNOSIS — E03.9 HYPOTHYROIDISM, UNSPECIFIED TYPE: ICD-10-CM

## 2025-03-26 DIAGNOSIS — E55.9 VITAMIN D DEFICIENCY: ICD-10-CM

## 2025-03-26 DIAGNOSIS — Z71.89 ADVANCE DIRECTIVE DISCUSSED WITH PATIENT: ICD-10-CM

## 2025-03-26 DIAGNOSIS — E78.5 HYPERLIPIDEMIA, UNSPECIFIED HYPERLIPIDEMIA TYPE: ICD-10-CM

## 2025-03-26 DIAGNOSIS — Z00.00 ROUTINE GENERAL MEDICAL EXAMINATION AT HEALTH CARE FACILITY: ICD-10-CM

## 2025-03-26 DIAGNOSIS — K21.9 CHRONIC GERD: ICD-10-CM

## 2025-03-26 DIAGNOSIS — I10 HYPERTENSION, ESSENTIAL, BENIGN: ICD-10-CM

## 2025-03-26 LAB
25(OH)D3+25(OH)D2 SERPL-MCNC: 31 NG/ML (ref 30–100)
ERYTHROCYTE [DISTWIDTH] IN BLOOD BY AUTOMATED COUNT: 15.8 % (ref 11–15)
HCT VFR BLD AUTO: 40.2 % (ref 35–45)
HGB BLD-MCNC: 13.1 G/DL (ref 11.7–15.5)
MCH RBC QN AUTO: 30.6 PG (ref 27–33)
MCHC RBC AUTO-ENTMCNC: 32.6 G/DL (ref 32–36)
MCV RBC AUTO: 93.9 FL (ref 80–100)
PLATELET # BLD AUTO: 245 THOUSAND/UL (ref 140–400)
PMV BLD REES-ECKER: 9.7 FL (ref 7.5–12.5)
RBC # BLD AUTO: 4.28 MILLION/UL (ref 3.8–5.1)
TSH SERPL-ACNC: 1.72 MIU/L (ref 0.4–4.5)
VIT B12 SERPL-MCNC: 530 PG/ML (ref 200–1100)
WBC # BLD AUTO: 5.7 THOUSAND/UL (ref 3.8–10.8)

## 2025-03-26 PROCEDURE — 3075F SYST BP GE 130 - 139MM HG: CPT | Performed by: NURSE PRACTITIONER

## 2025-03-26 PROCEDURE — 1159F MED LIST DOCD IN RCRD: CPT | Performed by: NURSE PRACTITIONER

## 2025-03-26 PROCEDURE — 1160F RVW MEDS BY RX/DR IN RCRD: CPT | Performed by: NURSE PRACTITIONER

## 2025-03-26 PROCEDURE — 1170F FXNL STATUS ASSESSED: CPT | Performed by: NURSE PRACTITIONER

## 2025-03-26 PROCEDURE — 3079F DIAST BP 80-89 MM HG: CPT | Performed by: NURSE PRACTITIONER

## 2025-03-26 PROCEDURE — G0439 PPPS, SUBSEQ VISIT: HCPCS | Performed by: NURSE PRACTITIONER

## 2025-03-26 PROCEDURE — 99497 ADVNCD CARE PLAN 30 MIN: CPT | Performed by: NURSE PRACTITIONER

## 2025-03-26 PROCEDURE — 99214 OFFICE O/P EST MOD 30 MIN: CPT | Performed by: NURSE PRACTITIONER

## 2025-03-26 ASSESSMENT — ENCOUNTER SYMPTOMS
LOSS OF SENSATION IN FEET: 0
OCCASIONAL FEELINGS OF UNSTEADINESS: 0
DEPRESSION: 0

## 2025-03-26 ASSESSMENT — ACTIVITIES OF DAILY LIVING (ADL)
DRESSING: INDEPENDENT
BATHING: INDEPENDENT
TAKING_MEDICATION: INDEPENDENT
GROCERY_SHOPPING: INDEPENDENT
DOING_HOUSEWORK: INDEPENDENT
MANAGING_FINANCES: INDEPENDENT

## 2025-03-26 NOTE — ASSESSMENT & PLAN NOTE
Orders:    Follow Up In [facility] - PCP - Established; Future    Basic metabolic panel; Future    CBC; Future

## 2025-03-26 NOTE — PROGRESS NOTES
"Subjective   Reason for Visit: Nina Dalal is an 84 y.o. female here for a Medicare Wellness visit.     Past Medical, Surgical, and Family History reviewed and updated in chart.    Reviewed all medications by prescribing practitioner or clinical pharmacist (such as prescriptions, OTCs, herbal therapies and supplements) and documented in the medical record.    Patient is follow-up for annual Medicare wellness exam, lab results review and management of hypertension, hypothyroidism, chronic GERD, folic acid deficiency and hyperlipidemia.  Her lab results are unremarkable.  She is compliant with all current medications.  She denies acute medical complaint.        Patient Care Team:  CJ Lima as PCP - General (Family Medicine)  CJ Lima as PCP - [facility] Attributed Provider     Review of Systems   All other systems reviewed and are negative.      Objective   Vitals:  /82   Pulse 74   Ht 1.626 m (5' 4\")   Wt 63.9 kg (140 lb 12.8 oz)   LMP  (LMP Unknown)   SpO2 95%   BMI 24.17 kg/m²       Physical Exam  Constitutional:       Appearance: Normal appearance.   HENT:      Head: Normocephalic and atraumatic.      Right Ear: Tympanic membrane, ear canal and external ear normal.      Left Ear: Tympanic membrane, ear canal and external ear normal.      Nose: Nose normal.      Mouth/Throat:      Mouth: Mucous membranes are moist.   Eyes:      Extraocular Movements: Extraocular movements intact.      Conjunctiva/sclera: Conjunctivae normal.      Pupils: Pupils are equal, round, and reactive to light.   Cardiovascular:      Rate and Rhythm: Normal rate and regular rhythm.      Pulses: Normal pulses.      Heart sounds: Normal heart sounds.   Pulmonary:      Effort: Pulmonary effort is normal.      Breath sounds: Normal breath sounds.   Abdominal:      General: Abdomen is flat. Bowel sounds are normal.      Palpations: Abdomen is soft.   Musculoskeletal:         General: Normal range of " motion.      Cervical back: Normal range of motion and neck supple.   Skin:     General: Skin is warm and dry.      Capillary Refill: Capillary refill takes more than 3 seconds.   Neurological:      General: No focal deficit present.      Mental Status: She is alert and oriented to person, place, and time.   Psychiatric:         Mood and Affect: Mood normal.         Behavior: Behavior normal.         Thought Content: Thought content normal.         Judgment: Judgment normal.         Assessment & Plan  Medicare annual wellness visit, subsequent    Orders:    Follow Up In [facility] - PCP - Medicare Annual    Routine general medical examination at [facility]    Orders:    1 Year Follow Up In [facility] - PCP - Wellness Exam; Future    Rheumatoid arthritis, involving unspecified site, unspecified whether rheumatoid factor present (Multi)         Hypertension, essential, benign    Orders:    Follow Up In [facility] - PCP - Established; Future    Basic metabolic panel; Future    CBC; Future    Advance directive discussed with patient         Hyperlipidemia, unspecified hyperlipidemia type         Folic acid deficiency         Hypothyroidism, unspecified type         Vitamin D deficiency         Chronic GERD

## 2025-03-26 NOTE — PATIENT INSTRUCTIONS
Your lab results are unremarkable.  Continue taking all current medications as prescribed and complete labs a few days prior to 6-month follow-up.

## 2025-03-30 VITALS
HEIGHT: 64 IN | HEART RATE: 74 BPM | OXYGEN SATURATION: 95 % | DIASTOLIC BLOOD PRESSURE: 80 MMHG | SYSTOLIC BLOOD PRESSURE: 138 MMHG | WEIGHT: 140.8 LBS | BODY MASS INDEX: 24.04 KG/M2

## 2025-04-15 DIAGNOSIS — K21.9 CHRONIC GERD: ICD-10-CM

## 2025-04-15 RX ORDER — LANSOPRAZOLE 30 MG/1
30 CAPSULE, DELAYED RELEASE ORAL DAILY
Qty: 90 CAPSULE | Refills: 3 | Status: SHIPPED | OUTPATIENT
Start: 2025-04-15

## 2025-04-30 DIAGNOSIS — E03.9 HYPOTHYROIDISM, UNSPECIFIED TYPE: ICD-10-CM

## 2025-04-30 RX ORDER — LEVOTHYROXINE SODIUM 75 UG/1
75 TABLET ORAL DAILY
Qty: 90 TABLET | Refills: 3 | Status: SHIPPED | OUTPATIENT
Start: 2025-04-30

## 2025-05-09 DIAGNOSIS — I10 HYPERTENSION, ESSENTIAL, BENIGN: ICD-10-CM

## 2025-05-09 RX ORDER — ENALAPRIL MALEATE 10 MG/1
10 TABLET ORAL 2 TIMES DAILY
Qty: 180 TABLET | Refills: 3 | Status: SHIPPED | OUTPATIENT
Start: 2025-05-09

## 2025-06-01 NOTE — PROGRESS NOTES
Counseling:  The patient was counseled regarding diagnostic results, instructions for management, risk factor reductions, prognosis, patient and family education, impressions, risks and benefits of treatment options and importance of compliance with treatment.      Chief Complaint:  The patient presents today for 6-month followup of HTN.      History Of Present Illness:    Nina Dalal is a 85 y.o. female patient who presents today for 6-month followup of HTN. Her PMH is significant for hyperlipidemia, HTN, hypothyroidism, GERD, anemia, and rheumatoid arthritis. Over the past 6 months, the patient states that she has done well from a cardiac standpoint. She denies any CP, chest discomfort or SOB. BP is elevated today, which the patient states is secondary to missing her morning medications. BP is otherwise stable. EKG today shows NSR with no acute changes. The patient is compliant with her prescribed medications.     Past Surgical History:  She has a past surgical history that includes Other surgical history (08/14/2020); Other surgical history (11/26/2019); Other surgical history (11/26/2019); and Other surgical history (10/26/2020).      Social History:  She reports that she has never smoked. She has never used smokeless tobacco. She reports that she does not currently use alcohol. She reports that she does not use drugs.    Family History:  Family History   Problem Relation Name Age of Onset    Diabetes Mother      Hypertension Mother      Lung cancer Father          Allergies:  Amoxicillin    Outpatient Medications:  Current Outpatient Medications   Medication Instructions    acetaminophen (TYLENOL) 1,000 mg, Every 8 hours PRN    atorvastatin (LIPITOR) 80 mg, oral, Nightly    enalapril (VASOTEC) 10 mg, oral, 2 times daily    folic acid (FOLVITE) 1 mg, oral, Daily    lansoprazole (PREVACID) 30 mg, oral, Daily    levothyroxine (SYNTHROID, LEVOXYL) 75 mcg, oral, Daily    methotrexate (Trexall) 2.5 mg tablet 6  "tablets, Once Weekly    metoprolol tartrate (LOPRESSOR) 50 mg, oral, 2 times daily        Last Recorded Vitals:  Vitals:    06/02/25 1045   BP: 152/88   BP Location: Left arm   Pulse: 82   Weight: 61.2 kg (135 lb)   Height: 1.638 m (5' 4.5\")           Review of Systems   All other systems reviewed and are negative.     Physical Exam:  Constitutional:       Appearance: Healthy appearance. Not in distress.   Neck:      Vascular: No JVR. JVD normal.   Pulmonary:      Effort: Pulmonary effort is normal.      Breath sounds: Normal breath sounds. No wheezing. No rhonchi. No rales.   Chest:      Chest wall: Not tender to palpatation.   Cardiovascular:      PMI at left midclavicular line. Normal rate. Regular rhythm. Normal S1. Normal S2.       Murmurs: There is no murmur.      No gallop.  No click. No rub.   Pulses:     Intact distal pulses.   Edema:     Peripheral edema absent.   Abdominal:      General: Bowel sounds are normal.      Palpations: Abdomen is soft.      Tenderness: There is no abdominal tenderness.   Musculoskeletal: Normal range of motion.         General: No tenderness. Skin:     General: Skin is warm and dry.   Neurological:      General: No focal deficit present.      Mental Status: Alert and oriented to person, place and time.            Last Labs:  CBC -  Lab Results   Component Value Date    WBC 5.7 03/25/2025    HGB 13.1 03/25/2025    HCT 40.2 03/25/2025    MCV 93.9 03/25/2025     03/25/2025       CMP -  Lab Results   Component Value Date    CALCIUM 9.2 03/25/2025    PHOS 2.3 (L) 04/23/2024    PROT 6.8 03/25/2025    ALBUMIN 4.0 03/25/2025    AST 24 03/25/2025    ALT 17 03/25/2025    ALKPHOS 82 03/25/2025    BILITOT 0.6 03/25/2025       LIPID PANEL -   Lab Results   Component Value Date    CHOL 167 03/25/2025    TRIG 104 03/25/2025    HDL 49 (L) 03/25/2025    CHHDL 3.4 03/25/2025    LDLF 106 (H) 09/07/2022    VLDL 20 03/22/2024    NHDL 118 03/25/2025       RENAL FUNCTION PANEL -   Lab Results "   Component Value Date    GLUCOSE 108 (H) 03/25/2025     03/25/2025    K 4.4 03/25/2025     03/25/2025    CO2 27 03/25/2025    ANIONGAP 8 03/25/2025    BUN 15 03/25/2025    CREATININE 0.72 03/25/2025    CALCIUM 9.2 03/25/2025    PHOS 2.3 (L) 04/23/2024    ALBUMIN 4.0 03/25/2025        Last Cardiology Tests:  05/22/2024 - TTE  1. Left ventricular systolic function is normal with a 60-65% estimated ejection fraction.  2. Mildly elevated RVSP.    05/22/2024 - Stress Test  1. Normal stress myocardial perfusion imaging in response to pharmacologic stress. Calculated ejection fraction is 69% without segmental wall motion abnormality seen.  2. No clinical or definite electrocardiographic evidence for ischemia at maximal infusion.     03/28/2024 - Vascular Lab Carotid Artery Duplex    1. Right Carotid: Findings are consistent with less than 50% stenosis of the right proximal internal carotid artery. Laminar flow seen by color Doppler. Right external carotid artery appears patent with no evidence of stenosis. The right vertebral artery is patent with antegrade flow. No evidence of hemodynamically significant stenosis in the right subclavian artery.  2. Left Carotid: Findings are consistent with less than 50% stenosis of the left proximal internal carotid artery. Laminar flow seen by color Doppler. Left external carotid artery appears patent with no evidence of stenosis. The left vertebral artery is patent with antegrade flow. No evidence of hemodynamically significant stenosis in the left subclavian artery.  3. Additional Findings: Tortuous vessels noted bilaterally.    10/09/2020- Stress Test  1. There is normal perfusion in all major segments. There is normal wall motion and normal left ventricular function . The gated ejection fraction is  74%(Normal over 50%).  2. No ECG changes from baseline. Non-diagnostic Stress Test.    10/09/2020 - TTE  1. The left ventricular systolic function is normal with a 65%  estimated ejection fraction.  2. Moderately increased left ventricular septal thickness.  3. Spectral Doppler shows an impaired relaxation pattern of left ventricular diastolic filling.    04/27/2018 - TTE  1. Normal left ventricular size and regional systolic function with an ejection fraction of 65%.  2. Normal right ventricular size and systolic function.     Lab review: I have personally reviewed the laboratory result(s).        Assessment/Plan   1) HTN  On enalapril 10 mg BID, metoprolol tartrate 50 mg BID  Diltiazem previously discontinued   Carotid U/S 03/28/2024 with <50% stenosis bilaterally  TTE 05/22/2024 with LVEF 60-65%, mildly elevated RVSP  Stress 05/22/2024 negative for ischemia  Limit dietary salt intake  Increase physical activity   Denies CP, chest discomfort or SOB  BP elevated today as patient missed morning medications  BP has otherwise been stable   In NSR   Continue current medical Rx   F/U 1 year       Scribe Attestation  By signing my name below, I, To Nevarez, attest that this documentation has been prepared under the direction and in the presence of Solo Calvo MD.

## 2025-06-02 ENCOUNTER — OFFICE VISIT (OUTPATIENT)
Dept: CARDIOLOGY | Facility: HOSPITAL | Age: 85
End: 2025-06-02
Payer: MEDICARE

## 2025-06-02 VITALS
DIASTOLIC BLOOD PRESSURE: 88 MMHG | HEIGHT: 65 IN | BODY MASS INDEX: 22.49 KG/M2 | WEIGHT: 135 LBS | SYSTOLIC BLOOD PRESSURE: 152 MMHG | HEART RATE: 82 BPM

## 2025-06-02 DIAGNOSIS — I10 HYPERTENSION, ESSENTIAL, BENIGN: ICD-10-CM

## 2025-06-02 PROCEDURE — 3077F SYST BP >= 140 MM HG: CPT | Performed by: INTERNAL MEDICINE

## 2025-06-02 PROCEDURE — 99213 OFFICE O/P EST LOW 20 MIN: CPT | Mod: 25 | Performed by: INTERNAL MEDICINE

## 2025-06-02 PROCEDURE — 93010 ELECTROCARDIOGRAM REPORT: CPT | Performed by: INTERNAL MEDICINE

## 2025-06-02 PROCEDURE — 99213 OFFICE O/P EST LOW 20 MIN: CPT | Performed by: INTERNAL MEDICINE

## 2025-06-02 PROCEDURE — 1159F MED LIST DOCD IN RCRD: CPT | Performed by: INTERNAL MEDICINE

## 2025-06-02 PROCEDURE — 93005 ELECTROCARDIOGRAM TRACING: CPT | Performed by: INTERNAL MEDICINE

## 2025-06-02 PROCEDURE — 1160F RVW MEDS BY RX/DR IN RCRD: CPT | Performed by: INTERNAL MEDICINE

## 2025-06-02 PROCEDURE — 1036F TOBACCO NON-USER: CPT | Performed by: INTERNAL MEDICINE

## 2025-06-02 PROCEDURE — 3079F DIAST BP 80-89 MM HG: CPT | Performed by: INTERNAL MEDICINE

## 2025-06-02 ASSESSMENT — ENCOUNTER SYMPTOMS
DEPRESSION: 0
OCCASIONAL FEELINGS OF UNSTEADINESS: 0
LOSS OF SENSATION IN FEET: 0

## 2025-06-02 NOTE — PATIENT INSTRUCTIONS
Continue all current medications as prescribed.  Followup with Dr. Calvo in 1 year, sooner should any issues or concerns arise before then.     If you have any questions or cardiac concerns, please call our office at [phone].

## 2025-06-05 LAB
ATRIAL RATE: 82 BPM
P AXIS: 67 DEGREES
P OFFSET: 205 MS
P ONSET: 151 MS
PR INTERVAL: 154 MS
Q ONSET: 228 MS
QRS COUNT: 13 BEATS
QRS DURATION: 100 MS
QT INTERVAL: 386 MS
QTC CALCULATION(BAZETT): 450 MS
QTC FREDERICIA: 428 MS
R AXIS: 28 DEGREES
T AXIS: 73 DEGREES
T OFFSET: 421 MS
VENTRICULAR RATE: 82 BPM

## 2025-06-26 PROCEDURE — 88305 TISSUE EXAM BY PATHOLOGIST: CPT | Performed by: DERMATOLOGY

## 2025-07-01 ENCOUNTER — LAB REQUISITION (OUTPATIENT)
Dept: DERMATOPATHOLOGY | Facility: CLINIC | Age: 85
End: 2025-07-01
Payer: MEDICARE

## 2025-07-01 DIAGNOSIS — L98.8 OTHER SPECIFIED DISORDERS OF THE SKIN AND SUBCUTANEOUS TISSUE: ICD-10-CM

## 2025-07-07 PROCEDURE — 88305 TISSUE EXAM BY PATHOLOGIST: CPT | Performed by: DERMATOLOGY

## 2025-07-09 ENCOUNTER — LAB REQUISITION (OUTPATIENT)
Dept: DERMATOPATHOLOGY | Facility: CLINIC | Age: 85
End: 2025-07-09
Payer: MEDICARE

## 2025-07-09 DIAGNOSIS — L98.9 DISORDER OF THE SKIN AND SUBCUTANEOUS TISSUE, UNSPECIFIED: ICD-10-CM

## 2025-07-21 DIAGNOSIS — R06.02 SHORTNESS OF BREATH: ICD-10-CM

## 2025-07-21 DIAGNOSIS — R94.31 EKG ABNORMALITY: ICD-10-CM

## 2025-07-21 DIAGNOSIS — I10 BENIGN ESSENTIAL HYPERTENSION: ICD-10-CM

## 2025-07-22 RX ORDER — METOPROLOL TARTRATE 50 MG/1
50 TABLET ORAL 2 TIMES DAILY
Qty: 180 TABLET | Refills: 3 | Status: SHIPPED | OUTPATIENT
Start: 2025-07-22

## 2025-09-26 ENCOUNTER — APPOINTMENT (OUTPATIENT)
Dept: PRIMARY CARE | Facility: CLINIC | Age: 85
End: 2025-09-26
Payer: MEDICARE

## 2026-03-26 ENCOUNTER — APPOINTMENT (OUTPATIENT)
Dept: PRIMARY CARE | Facility: CLINIC | Age: 86
End: 2026-03-26
Payer: MEDICARE